# Patient Record
Sex: MALE | Race: WHITE
[De-identification: names, ages, dates, MRNs, and addresses within clinical notes are randomized per-mention and may not be internally consistent; named-entity substitution may affect disease eponyms.]

---

## 2017-06-11 NOTE — ED
Back Pain





- HPI Summary


HPI Summary: 





Patient presents to ED with CC of lower back pain which began 3 days ago with 

no known trauma or injury.  He has been taking tylenol for minimal relief of 

pain.  However, today he was unable to stand straight and upon trying to stand 

a few times, fell to the floor.  lower back "band-like" across the lower 

midspine and bilateral flanks without radiation to the legs or upper spineHe 

has been unable to sit upright, stand or twist since that time. Denies bladder 

or bowel dysfunction.  Denies health problems.  Pain is 10/10 when moving, 4/10 

when sitting in a supine with slight flexion of the hips position.  While pain 

has been worsening over 3 days, today he states is the worst and has been 

unable to stand without falling.   is at bedside.  Ambulance called 

after  was unable to carry him into the car d/t pain.  Denies recent 

illness, infection or injuries.  Patient does not work and states he has never 

had back problems.  Personal hx of HIV and Hepatitis.  Denies IV drug use.





- History of Current Complaint


Chief Complaint: EDBackInjuryPain


Stated Complaint: BACK PAIN


Time Seen by Provider: 06/11/17 11:59


Hx Obtained From: Patient


Onset/Duration: Sudden Onset


Onset/Duration: Started Days Ago


Timing: Constant


Back Pain Location: Is Discrete @ - lower back "band-like" across the lower 

midspine and bilateral flanks without radiation to the legs or upper spine


Pain Intensity: 4


Pain Scale Used: 0-10 Numeric


Character: Aching, Throbbing, Stiffness


Aggravating Symptom(s): Movement, Bending


Alleviating Symptom(s): Rest, Position


Associated Signs And Symptoms: Positive: Negative





- Risk Factors


AAA Risk Factors: Negative


TAD Risk Factors: Negative


Cauda Equina Risk Factors: Negative


Epidural Abscess Risk Factors: Negative





- Allergies/Home Medications


Allergies/Adverse Reactions: 


 Allergies











Allergy/AdvReac Type Severity Reaction Status Date / Time


 


Penicillins [PCN] Allergy  Unknown Verified 10/05/16 15:16





   Reaction  





   Details  


 


Hydromorphone [From Dilaudid] AdvReac Severe Nausea And Verified 10/05/16 15:16





   Vomiting  














PMH/Surg Hx/FS Hx/Imm Hx


Previously Healthy: Yes


Endocrine/Hematology History: 


   Denies: Hx Diabetes


Cardiovascular History: 


   Denies: Hx Congestive Heart Failure


Sensory History: Reports: Hx Contacts or Glasses


Opthamlomology History: Reports: Hx Contacts or Glasses





- Surgical History


Surgery Procedure, Year, and Place: liver bx.  double hernia surgery





- Immunization History


Hx Pertussis Vaccination: No


Immunizations Up to Date: Unable to Obtain/Confirm


Infectious Disease History: No


Infectious Disease History: Reports: Hx Hepatitis, Hx Human Immunodeficiency 

Virus (HIV)


   Denies: History Other Infectious Disease, Traveled Outside the [place] in Last 30 

Days





- Family History


Family History: NON CONTRIBUTORY





- Social History


Occupation: Unemployed


Lives: With Family


Alcohol Use: Weekly


Alcohol Amount: WEEKENDS


Hx Substance Use: Yes


Substance Use Type: Reports: Marijuana


Hx Tobacco Use: No


Smoking Status (MU): Never Smoked Tobacco





Review of Systems


Constitutional: Negative


Eyes: Negative


Cardiovascular: Negative


Respiratory: Negative


Gastrointestinal: Negative


Positive: no symptoms reported, see HPI


Positive: Arthralgia - low back pain


Neurological: Negative


Psychological: Normal


All Other Systems Reviewed And Are Negative: Yes





Physical Exam


Triage Information Reviewed: Yes


Vital Signs On Initial Exam: 


 Initial Vitals











Temp Pulse Resp BP Pulse Ox


 


 99.1 F   61   20   145/79   94 


 


 06/11/17 11:36  06/11/17 11:36  06/11/17 11:36  06/11/17 11:36  06/11/17 11:36











Vital Signs Reviewed: Yes


Appearance: Positive: Well-Appearing, Well-Nourished


Skin: Positive: Warm, Skin Color Reflects Adequate Perfusion


Head/Face: Positive: Normal Head/Face Inspection


Eyes: Positive: EOMI, HAZEL, Conjunctiva Clear


Neck: Positive: Supple, Nontender, No Lymphadenopathy


Respiratory/Lung Sounds: Positive: Clear to Auscultation, Breath Sounds Present


Cardiovascular: Positive: Normal, RRR, Pulses are Symmetrical in both Upper and 

Lower Extremities


Musculoskeletal: Positive: Other - Thorough physical exam was performed, 

focusing on thoracic and lumbar special tests and ROM.  Due to patient pain 

around injury, physical exam was limited.  Limited ROM.  Flip Test negative. 

Straight leg raise positive. Kernig test positive. Negative Babinksi. Hip 

flexion and extension, knee extension, dorsiflexion, great toe extension and 

plantar flexion intact. Rotating at hips limited d/t pain. Nerve roots L4-S2 

reflexes intact.  L1-S2 nerve root sensory intact.   No saddle anesthesia.  

Unable to assess gait.


Neurological: Positive: Sensory/Motor Intact, Alert, Oriented to Person Place, 

Time, Speech Normal


Psychiatric: Positive: Normal





Diagnostics





- Vital Signs


 Vital Signs











  Temp Pulse Resp BP Pulse Ox


 


 06/11/17 11:36  99.1 F  61  20  145/79  94














- Laboratory


Lab Statement: Any lab studies that have been ordered have been reviewed, and 

results considered in the medical decision making process.





Back Pain Course/Dx





- Course


Course Of Treatment: Patient sent to imaging.  IMPRESSION: Multilevel mild 

degenerative spondylosis with worsening compared with the 2009.  exam. At L1-L2 

there is suggestion of a small LEFT paracentral disc extrusion with mild.  

cephalad extension resulting mild impression on the LEFT ventral margin of the 

thecal sac.  Patient given orthopedic follow up in 5-7 days.  Encouraged 

Ibuprofen 600mg three times daily with meals for pain.  Toradol given as rx for 

4 days.  Flexeril given for rx.  Follow up with Dr. Hou. Lidocaine patch 5%

. Return precautions given.  Educated patient regarding back injuries and 

healing time and the need for further imaging if discomfort is present for > 6 

weeks.





- Diagnoses


Differential Diagnosis/HQI/PQRI: Positive: Herniated Disc, Strain, Sprain


Provider Diagnoses: 


 Extrusion of intervertebral disc








Discharge





- Discharge Plan


Condition: Stable


Disposition: HOME


Prescriptions: 


Cyclobenzaprine TAB* [Flexeril TAB*] 10 mg PO BID PRN #15 tab


 PRN Reason: Pain


Cyclobenzaprine TAB* [Flexeril TAB*] 10 mg PO BID #15 tab


Ketorolac TAB * [Toradol TAB *] 10 mg PO Q6H #16 tab


Ketorolac TAB * [Toradol TAB *] 10 mg PO Q6H #16 tab


Lidocaine PATCH 5%* [Lidoderm 5% Patch*] 1 patch TRANSDERM DAILY #7 patch


Lidocaine PATCH 5%* [Lidoderm 5% Patch*] 1 patch TRANSDERM DAILY #7 patch


Patient Education Materials:  Lumbar Disc Herniation (ED)


Referrals: 


Mike ARIAS,Rod PRATT [Primary Care Provider] - 


Josh Hou MD [Medical Doctor] - 


Additional Instructions: 





Follow up with PCP as scheduled on Tuesday.





Take Flexiril and toradol as prescribed.





Follow up with Dr. Hou.





Flexeril:  This medication is a muscle relaxant and can help relieve muscle 

spasms, muscle strain, or pain sensations.  Flexeril can cause side effects 

that may impair your thinking or reactions. Be careful if you drive or do 

anything that requires you to be awake and alert. Avoid drinking alcohol, which 

can increase some of the side effects of Flexeril.


Ibuprofen 600mg three times daily with meals for discomfort.


Return to ED if symptoms worsen or fail to improve, notice worsening swelling, 

warmth or redness around the joint, develop fever, or pain is uncontrolled with 

OTC medications.


Moist heat to the area for comfort.  Warm showers or baths may improve 

symptoms.  It is important to remain mobile as tolerated to prevent stiffening 

of the joints and delay healing.  


Follow up with your PCP.  If symptoms remain for > 6 weeks, please seek special 

medical attention from an orthopedic physician.

## 2017-06-11 NOTE — RAD
Indication: Severe back pain since Wednesday with worsening today. No known injury.



Comparison: November 19, 2009 CT abdomen pelvis including sagittal reformatted series.



Technique: Noncontrast CT lumbar sacral spine. Multiplanar reformation.



Report: 2.3 cm water density cyst medial midpole RIGHT kidney. 2 mm calyceal stone versus

vascular calcification midpole RIGHT kidney. Negative for hydronephrosis.



Negative for fracture or spondylolysis at any level.  Normal vertebral alignment without

spondylolisthesis at any level.



Multilevel mild vertebral endplate osteophytosis.



T12-L1: Unremarkable disc level for age without acquired spinal stenosis.



L1-L2: Suggestion of a small LEFT paracentral disc herniation with mild cephalad extension

with resulting impression on the ventral margin of the thecal sac which appears new

compared with the 2009 exam.



L2-L3: Annular disc bulge with mild resulting impression on the ventral margin of the

thecal sac.



L3-L4: Unremarkable disc level for age without acquired spinal stenosis.



L4-L5: Minimal annular disc bulge without significant impression on the thecal sac.



L5-S1: Minimal annular disc bulge without resulting impression on the thecal sac.



No CT evidence for significant foraminal stenosis at any level.



IMPRESSION: Multilevel mild degenerative spondylosis with worsening compared with the 2009

exam. At L1-L2 there is suggestion of a small LEFT paracentral disc extrusion with mild

cephalad extension resulting mild impression on the LEFT ventral margin of the thecal sac.

## 2018-06-29 NOTE — ED
Shortness of Breath





- HPI Summary


HPI Summary: 


Patient is a 43-year-old male with history of HIV/AIDS and distant cirrhosis 

from hepatitis C presents from Dr. Monk's office with complaint of 

worsening shortness of breath.  He states he has been otherwise well, denies 

any chest pain, confusion, excess fatigue.  Return from a trip from [place] 

proximally 1 month ago but denies any other more recent travel.  He is 

currently being treated with prednisone for his recent shortness of breath.  X-

ray obtained on 6/20/18 and was negative for any findings.  He is sent in today 

by Dr. Monk for a CTA for a possible PE versus other pathology.  He states 

he feels otherwise at his baseline other than his SOB.  Not on oxygen at home.








- History of Current Complaint


Chief Complaint: EDShortnessOfBreath


Time Seen by Provider: 06/29/18 13:25


Hx Obtained From: Patient


Onset/Duration: Gradual Onset


Timing: Constant


Current Severity: Moderate


Dyspnea At: Rest


Aggrevating Factors: Nothing


Alleviating Factors: Nothing


Associated Signs & Symptoms: Negative





- Risk Factors


Pulmonary Embolism: Recent Travel


Tuberculosis: Immune Deficiency





- Allergy/Home Medications


Allergies/Adverse Reactions: 


 Allergies











Allergy/AdvReac Type Severity Reaction Status Date / Time


 


MS Penicillins [PCN] Allergy  Unknown Verified 11/20/17 09:45





   Reaction  





   Details  


 


MS Hydromorphone AdvReac Severe Nausea And Verified 11/20/17 09:45





[From Dilaudid]   Vomiting  











Home Medications: 


 Home Medications





Abacavir/Dolutegravir/Lamivudi [Triumeq 600- mg] 1 tab PO DAILY 06/29/18 [

History Confirmed 06/29/18]


Sulfamethox/Trimethoprim DS* [Bactrim /160 TAB*] 1 tab PO BID 06/29/18 [

History Confirmed 06/29/18]


predniSONE TAB* [Deltasone 10 MG TAB*] 20 mg PO DAILY 06/29/18 [History 

Confirmed 06/29/18]











PMH/Surg Hx/FS Hx/Imm Hx


Previously Healthy: No - HIV/AIDS


Endocrine/Hematology History: 


   Denies: Hx Diabetes


Cardiovascular History: 


   Denies: Hx Congestive Heart Failure, Hx Hypertension, Hx Pacemaker/ICD


Respiratory History: 


   Denies: Hx Asthma


 History: 


   Denies: Hx Renal Disease


Sensory History: Reports: Hx Contacts or Glasses


   Denies: Hx Hearing Aid


Opthamlomology History: Reports: Hx Contacts or Glasses


Psychiatric History: 


   Denies: Hx Panic Disorder





- Surgical History


Surgery Procedure, Year, and Place: liver bx.  double hernia surgery





- Immunization History


Hx Pertussis Vaccination: Yes


Immunizations Up to Date: Yes


Infectious Disease History: No


Infectious Disease History: Reports: Hx Hepatitis, Hx Human Immunodeficiency 

Virus (HIV)


   Denies: History Other Infectious Disease, Traveled Outside the [place] in Last 30 

Days





- Family History


Family History: NON CONTRIBUTORY





- Social History


Occupation: Unemployed


Lives: With Family


Alcohol Use: Weekly


Alcohol Amount: WEEKENDS


Hx Substance Use: Yes


Substance Use Type: Reports: Marijuana


Substance Use Comment - Amount & Last Used: Socially


Hx Tobacco Use: No


Smoking Status (MU): Never Smoked Tobacco





Review of Systems


Positive: Fatigue.  Negative: Fever, Chills, Skin Diaphoresis


Negative: Diplopia, Other - scleraicteric


Negative: Sore Throat, Ear Ache, Nasal Discharge


Negative: Palpitations, Chest Pain


Positive: Shortness Of Breath.  Negative: Cough


Negative: Abdominal Pain, Vomiting, Diarrhea, Nausea


Negative: Arthralgia, Myalgia


Negative: Rash, Bruising


Positive: Weakness - mild


Psychological: Normal


All Other Systems Reviewed And Are Negative: Yes





Physical Exam


Triage Information Reviewed: Yes


Vital Signs On Initial Exam: 


 Initial Vitals











Temp Pulse Resp BP Pulse Ox


 


 98.8 F   79   18   164/104   99 


 


 06/29/18 13:26  06/29/18 13:26  06/29/18 13:26  06/29/18 13:26  06/29/18 13:26











Vital Signs Reviewed: Yes


Appearance: Positive: Well-Appearing


Skin: Positive: Jaundiced - mild


Head/Face: Positive: Normal Head/Face Inspection


Eyes: Positive: Other: - scleralicteric


Neck: Positive: Supple


Respiratory/Lung Sounds: Positive: Other - expiratory wheexes in R lung base


Cardiovascular: Positive: RRR, Pulses are Symmetrical in both Upper and Lower 

Extremities


Musculoskeletal: Positive: Normal, Strength/ROM Intact


Neurological: Positive: Sensory/Motor Intact, Alert, Oriented to Person Place, 

Time, Speech Normal


Psychiatric: Positive: Normal, Affect/Mood Appropriate


AVPU Assessment: Alert





Diagnostics





- Vital Signs


 Vital Signs











  Temp Pulse Resp BP Pulse Ox


 


 06/29/18 13:26  98.8 F  79  18  164/104  99














- Laboratory


Lab Results: 


 Lab Results











  06/29/18 06/29/18 06/29/18 Range/Units





  13:58 13:58 13:58 


 


WBC  3.4 L    (3.5-10.8)  10^3/ul


 


RBC  2.97 L    (4.00-5.40)  10^6/ul


 


RBC (Retic)  2.95 L    (4.6-6.2)  10^6/ul


 


Hgb  9.6 L    (14.0-18.0)  g/dl


 


Hct  30 L    (42-52)  %


 


HCT (Retic)  30 L    (42-52)  %


 


MCV  101 H    (80-94)  fL


 


MCH  32 H    (27-31)  pg


 


MCHC  32    (31-36)  g/dl


 


RDW  16 H    (10.5-15)  %


 


Plt Count  100 L    (150-450)  10^3/ul


 


MPV  8.2    (7.4-10.4)  um3


 


Neut % (Auto)  57.0    (38-83)  %


 


Lymph % (Auto)  35.6    (25-47)  %


 


Mono % (Auto)  6.4    (0-7)  %


 


Eos % (Auto)  0.3    (0-6)  %


 


Baso % (Auto)  0.7    (0-2)  %


 


Absolute Neuts (auto)  1.9    (1.5-7.7)  10^3/ul


 


Absolute Lymphs (auto)  1.2    (1.0-4.8)  10^3/ul


 


Absolute Monos (auto)  0.2    (0-0.8)  10^3/ul


 


Absolute Eos (auto)  0    (0-0.6)  10^3/ul


 


Absolute Basos (auto)  0    (0-0.2)  10^3/ul


 


Absolute Nucleated RBC  0    10^3/ul


 


Nucleated RBC %  0.4    


 


Smear Path Review  Pending    


 


Retic Count, Calc  10.7 H    (0.5-1.5)  %


 


Corrected Retic Count  7.1 H    (0.5-1.5)  %


 


Retic Shift Factor  1.5    


 


Retic Production Index  4.70    


 


Immature Retic Fraction  0.64    


 


Mean Retic Volume  121.3    


 


Sodium   139   (135-145)  mmol/L


 


Potassium   3.8   (3.5-5.0)  mmol/L


 


Chloride   106   (101-111)  mmol/L


 


Carbon Dioxide   26   (22-32)  mmol/L


 


Anion Gap   7   (2-11)  mmol/L


 


BUN   17   (6-24)  mg/dL


 


Creatinine   1.12   (0.67-1.17)  mg/dL


 


Est GFR ( Amer)   87.4   (>60)  


 


Est GFR (Non-Af Amer)   72.3   (>60)  


 


BUN/Creatinine Ratio   15.2   (8-20)  


 


Glucose   103 H   ()  mg/dL


 


Lactic Acid    1.4  (0.5-2.0)  mmol/L


 


Calcium   9.4   (8.6-10.3)  mg/dL


 


Total Bilirubin   3.00 H   (0.2-1.0)  mg/dL


 


AST   42 H   (13-39)  U/L


 


ALT   27   (7-52)  U/L


 


Alkaline Phosphatase   41   ()  U/L


 


Lactate Dehydrogenase   412 H   (140-271)  U/L


 


Total Protein   6.9   (6.4-8.9)  g/dL


 


Albumin   4.6   (3.2-5.2)  g/dL


 


Globulin   2.3   (2-4)  g/dL


 


Albumin/Globulin Ratio   2.0   (1-3)  


 


Blood Parasite Screen  Pending    











Result Diagrams: 


 06/29/18 13:58





 06/29/18 13:58


Lab Statement: Any lab studies that have been ordered have been reviewed, and 

results considered in the medical decision making process.





Course/Dx





- Course


Course Of Treatment: On arrival to the ED, patient was immediately placed on 

oxygen as he was satting at approximately 86%.  He was satting 84% in Dr. Monk's office PTA.  2 L oxygen [place] O2 sat to 89% he was then placed on 4 L 

oxygen which raised to 92%.  Labs obtained which show a hemolytic anemia and 

CTA shows marketed splenomegaly which could be long-standing or more acute.  

Dr. Monk to see patient who recommends admit to hospitalist service.  Dr. Hollis agrees to admit patient.  Currently ABGs are ordered.  Vital signs 

remain fairly stable with patient on 4L O2.





- Diagnoses


Provider Diagnoses: 


 Shortness of breath








- Physician Notifications


Discussed Care of Patient With: Rod Monk MD - agrees to come see 

patient in ED


Instructed by Provider To: Admit As Inpatient





Discharge





- Sign-Out/Discharge


Documenting (check all that apply): Discharge/Admit/Transfer





- Discharge Plan


Condition: Fair


Disposition: ADMITTED TO [facility]


Referrals: 


Rod Monk MD [Primary Care Provider] - 





- Billing Disposition and Condition


Condition: FAIR


Disposition: Admitted to [facility]

## 2018-06-29 NOTE — RAD
INDICATION: Chest pain. Short of breath for 2 weeks. Evaluate for pulmonary embolus or

pneumonia.



COMPARISON: Chest x-ray June 20, 2018; CTA chest May 03, 2016; CT abdomen pelvis December 19, 2009

 

TECHNIQUE: Axial source images were obtained from the thoracic inlet to the hemidiaphragms

following administration of 83 cc Omnipaque 350. CT angiographic technique was utilized.

Coronal and sagittal reconstructed images were acquired.



CHEST FINDINGS:



Neck/thyroid: The visualized neck to include the thyroid appear normal.



Chest wall: There are no acute abnormalities of the bony thorax or chest wall. There is no

supraclavicular, infraclavicular, or axillary lymphadenopathy.



Lungs : There are no pulmonary parenchymal masses or infiltrates. The pulmonary

interstitium appears normal. There are no endobronchial lesions.



Cardiomediastinal structures: There is no CT evidence of acute pulmonary embolic disease.



The heart is normal in size. There is no pericardial effusion.  There is no evidence of

aortic aneurysm or dissection. There is no mediastinal or hilar adenopathy. The esophagus

appears normal.



Pleura : There are no pleural-based masses or effusions.



Other: There is marked splenomegaly. This has probably not changed.



IMPRESSION:  NO CT EVIDENCE OF ACUTE PULMONARY EMBOLIC DISEASE. LUNGS CLEAR. MARKED

SPLENOMEGALY

## 2018-06-30 NOTE — PN
Subjective


Date of Service: 06/30/18


Interval History: 





Pt is still hypoxemic and c/o occasional dry cough and thigh chest





Objective


Active Medications: 








Acetaminophen (Tylenol Tab*)  650 mg PO Q4H PRN


   PRN Reason: FEVER/PAIN


   Last Admin: 06/30/18 07:50 Dose:  650 mg


Prednisone (Deltasone Tab*)  100 mg PO DAILY WALESKA








 Vital Signs - 8 hr











  06/30/18 06/30/18 06/30/18





  11:19 12:14 15:34


 


Temperature 98.1 F  96.9 F


 


Pulse Rate 64 61 67


 


Respiratory 20  12





Rate   


 


Blood Pressure  148/77 138/79





(mmHg)   


 


O2 Sat by Pulse 91 92 91





Oximetry   











Oxygen Devices in Use Now: Nasal Cannula


Appearance: 42 yo m in nAD, aAOx3


Eyes: No Scleral Icterus, PERRLA


Ears/Nose/Mouth/Throat: NL Teeth, Lips, Gums, Mucous Membranes Moist


Neck: NL Appearance and Movements; NL JVP, Trachea Midline


Respiratory: Symmetrical Chest Expansion and Respiratory Effort, - - coarse 

breath sounds b/l


Cardiovascular: NL Sounds; No Murmurs; No JVD, RRR


Abdominal: NL Sounds; No Tenderness; No Distention


Lymphatic: No Cervical Adenopathy


Extremities: No Edema, No Clubbing, Cyanosis


Skin: No Rash or Ulcers, No Nodules or Sclerosis


Neurological: Alert and Oriented x 3, NL Muscle Strength and Tone


Result Diagrams: 


 06/30/18 06:32





 06/30/18 06:33


Additional Lab and Data: 


 Lab Results











  06/29/18 06/29/18 06/29/18 Range/Units





  13:58 13:58 13:58 


 


WBC  3.4 L    (3.5-10.8)  10^3/ul


 


RBC  2.97 L    (4.00-5.40)  10^6/ul


 


RBC (Retic)  2.95 L    (4.6-6.2)  10^6/ul


 


Hgb  9.6 L    (14.0-18.0)  g/dl


 


Hct  30 L    (42-52)  %


 


HCT (Retic)  30 L    (42-52)  %


 


MCV  101 H    (80-94)  fL


 


MCH  32 H    (27-31)  pg


 


MCHC  32    (31-36)  g/dl


 


RDW  16 H    (10.5-15)  %


 


Plt Count  100 L    (150-450)  10^3/ul


 


MPV  8.2    (7.4-10.4)  um3


 


Neut % (Auto)  57.0    (38-83)  %


 


Lymph % (Auto)  35.6    (25-47)  %


 


Mono % (Auto)  6.4    (0-7)  %


 


Eos % (Auto)  0.3    (0-6)  %


 


Baso % (Auto)  0.7    (0-2)  %


 


Absolute Neuts (auto)  1.9    (1.5-7.7)  10^3/ul


 


Absolute Lymphs (auto)  1.2    (1.0-4.8)  10^3/ul


 


Absolute Monos (auto)  0.2    (0-0.8)  10^3/ul


 


Absolute Eos (auto)  0    (0-0.6)  10^3/ul


 


Absolute Basos (auto)  0    (0-0.2)  10^3/ul


 


Absolute Nucleated RBC  0    10^3/ul


 


Nucleated RBC %  0.4    


 


Smear Path Review  Pending    


 


Retic Count, Calc  10.7 H    (0.5-1.5)  %


 


Corrected Retic Count  7.1 H    (0.5-1.5)  %


 


Retic Shift Factor  1.5    


 


Retic Production Index  4.70    


 


Immature Retic Fraction  0.64    


 


Mean Retic Volume  121.3    


 


Sodium   139   (135-145)  mmol/L


 


Potassium   3.8   (3.5-5.0)  mmol/L


 


Chloride   106   (101-111)  mmol/L


 


Carbon Dioxide   26   (22-32)  mmol/L


 


Anion Gap   7   (2-11)  mmol/L


 


BUN   17   (6-24)  mg/dL


 


Creatinine   1.12   (0.67-1.17)  mg/dL


 


Est GFR ( Amer)   87.4   (>60)  


 


Est GFR (Non-Af Amer)   72.3   (>60)  


 


BUN/Creatinine Ratio   15.2   (8-20)  


 


Glucose   103 H   ()  mg/dL


 


Lactic Acid    1.4  (0.5-2.0)  mmol/L


 


Calcium   9.4   (8.6-10.3)  mg/dL


 


Total Bilirubin   3.00 H   (0.2-1.0)  mg/dL


 


AST   42 H   (13-39)  U/L


 


ALT   27   (7-52)  U/L


 


Alkaline Phosphatase   41   ()  U/L


 


Lactate Dehydrogenase   412 H   (140-271)  U/L


 


Total Protein   6.9   (6.4-8.9)  g/dL


 


Albumin   4.6   (3.2-5.2)  g/dL


 


Globulin   2.3   (2-4)  g/dL


 


Albumin/Globulin Ratio   2.0   (1-3)  


 


Blood Parasite Screen  Pending    














Assess/Plan/Problems-Billing


Assessment: 42 yo M with h/o HIV and Hep C presented with anemia and hypoxemia











- Patient Problems


(1) Anemia


Comment: although emily neg, LDH and bili as well as retic count high. D/w DR. Rankin: consistent with hemolytic anemia


Strated prednisone 100 mg daily   





(2) Hypoxia


Comment: CTA unremarkable.  will start Albuterol and incentive spirometry


Had similar presentation in 2016


D/w Dr. Mckeon and Chucho. Although possibility of Bactrim (that pt took 

chronically) to cause hemolytic aenmia now is low, will cont to hold


Due to possibility of PCP causing symptoms, will start Clindamycin and 

Atoivaquone.


no further serology tets recommended by ID. It is possible that atypical 

infection with virus or mycoplasma could have caused the anemia   





(3) AIDS


Comment: cont Triumeq


CD4 count on 5/15/18 157   





(4) Hepatitis C


Comment: ID will follow up as outpatient 


Pt was also noted to have splenomagaly on CT, as per d/w Dr. Rankin, it is 

similar to prior and may be related to pt's chronic liver disease   





(5) Thrombocytopenia


Comment: worsening of chronic, cont to monitor   





(6) DVT prophylaxis


Comment: Low risk, encourage ambulation

## 2018-06-30 NOTE — HP
CC:  Dr. Mckeon *

 

HISTORY AND PHYSICAL:

 

DATE OF ADMISSION:  06/29/18

 

PROVIDER:  Cassandra Dela Cruz NP

 

PRIMARY CARE PROVIDER:  Rod Mckeon MD

 

ATTENDING PHYSICIAN WHILE IN THE HOSPITAL:  Jose Hollis MD * (dictated by 
Cassandra Dela Cruz NP)

 

CHIEF COMPLAINT:  Shortness of breath.

 

HISTORY OF PRESENT ILLNESS:  Mr. Mccann is 43-year-old gentleman with a past 
medical history significant for HIV and hepatitis C, who reports that his HIV 
was originally diagnosed with AIDS before starting on therapy 20 years ago.  
Mr. Mccann reports to the emergency room complaining of increased shortness of 
breath and cough that is nonproductive.  The patient reports that he was in 
[place] and returned on June 3rd. Since that time he has felt he has had 
increased shortness of breath and a cough that has progressively gotten worse 
over the past few weeks. Due to his symptoms, he was seen by Dr. Mckeon on 06/
20/18 for a cough that was nonproductive and at times, the cough got so 
excessive that it was hard for him to catch his breath that was prior to 
starting antibiotics on 06/20/18.  He was seen at Dr. Mckeon's office on 06/20
/18, he was started on Bactrim DS 1 tablet p.o. b.i.d. and as well as 
prednisone 20 mg p.o. daily.  He states that he was taking his medications, 
which he started on the 20th and then he took them the 21st, 22nd, 23rd, 24th. 
He reports that on Monday, he had difficulty with medications as he vomited 
after taking the Bactrim and was consistently nauseous.  He has been unable to 
keep his antibiotics down since Monday.  He called and had a reexamination by 
Dr. Mckeon today and was sent to  the emergency room for further evaluation 
of his increased shortness of breath and hypoxia. He was also found to have a 
low O2 saturations in the office today, so he was sent to the emergency room 
for further evaluation. 



While in the emergency room, he was placed on the monitor, had routine lab work
, he was found to have an O2 saturation of 86%.  He was placed on O2 via nasal 
cannula at 4 L, which he reports help alleviate his shortness of breath 
symptoms.    He denies any fever or chills.  Denies any loss of appetite. 
Denies chest pain or edema.  He does report a cough.  Denies any hemoptysis.  
He does report increased shortness of breath x3 weeks.  He does also report 
some nausea and dry heaves and does report he has had diarrhea 06/21, after 
starting the antibiotic, denies any abdominal pain. Denies any gross hematuria 
or dysuria. He denies any focal weakness or sensory loss.  Denies any visual 
complaints.  Denies any difficulty swallowing.  Denies any arthralgias or 
myalgias.  Denies any rashes or lesions.  Denies any depression. He does report 
some mild anxiety due to his hypoxia and room air O2 saturations at 86%.  We 
are asked to see him and evaluate him for admission.

 

PAST MEDICAL HISTORY:  Significant for:

1.  HIV.

2.  Hepatitis C.

 

PAST SURGICAL HISTORY:  None.

 

FAMILY HISTORY:  No reported history of coronary arteries, diabetes, or cancer.

 

SOCIAL HISTORY:  The patient denies tobacco use. He does report occasional 
alcohol use approximately once a week.  He does also report occasional 
marijuana use.  He is currently disabled.  Surrogate decision maker in the 
event he is unable to make his own decision is his partner, Doroteo Obrien; 
his phone number is [phone]. The patient is a full code.

 

REVIEW OF SYSTEMS:  There was no documented fever.  There has been no 
significant weight change.  There is no double vision.  No ear discharge.  
Denies any rhinorrhea.  He denies any sore throat.  He denies any chest pain.  
Denies any orthopnea or nocturnal dyspnea.  There was no abdominal pain.  He 
does report some nausea and dry heaving and diarrhea.  He denies any dysuria or 
urinary frequency. Denies any seizures or loss of consciousness.  No pruritus 
or skin ulcerations.  He does report some shortness of breath x3 weeks with a 
nonproductive cough.  A review of 14 systems was completed and all others were 
negative.

 

                               PHYSICAL EXAMINATION

 

GENERAL:  At this time, Mr. Mccann is a 43-year-old male, who appears well, 
sitting on the stretcher in the emergency room.  He does not appear to be in 
any acute distress.

 

VITAL SIGNS:  Blood pressure 144/93, temperature was 98.8, pulse was 74, 
respirations 17, O2 saturation with 2 L nasal cannula is 90 to 92.

 

HEENT:  Head is atraumatic, normocephalic.  Eyes:  EOMs are intact.  Sclerae 
anicteric and not pale.  Oral mucosa appeared to be moist.

 

NECK:  Supple.

 

LUNGS:  Clear to auscultation bilaterally.  No wheezes, rales, or rhonchi.

 

CARDIAC:  S1, S2.  Regular rate and rhythm.  There are no murmur, rubs, or 
gallops.

 

ABDOMEN:  Soft and nontender.  Bowel sounds are present x4.

 

EXTREMITIES:  Pulses are +2 throughout.  He is able to move all 4 extremities 
with 5/5 strength.

 

NEUROLOGIC:  He is awake, he is alert and oriented x4.  Speech is clear.  There 
are no focal deficits.

 

SKIN:  Intact.

 

 DIAGNOSTIC STUDIES/LAB DATA:  WBCs are 3.4, RBCs 2.97, RBC retic is 2.95, 
hemoglobin was 9.6, hematocrit was 30, hematocrit retic is 30, MCV is 101, MCH 
was 32, RDW is 16, platelet count was 100, retic count calculated was 10.7, 
corrected retic count is 7.1.  D-dimer was less than 200.  ABG, pH was 7.44, 
pCO2 was 35, pO2 was 74, HCO3 was 24.6, O2 saturation was 87.5. Chemistry, 
sodium 139, potassium 3.8, chloride was 106, carbon dioxide was 26, anion gap 
was 7, BUN was 17, creatinine 1.12, glucose was 103, lactic acid was 1.4, 
calcium 9.4, total bilirubin was 3.0, ASTs are 42, ALTs were 27, lactate 
dehydrogenase was 412, blood parasite screen, no parasites were seen.

 

He had a CTA of the chest. No evidence of acute pulmonary embolic disease.  
Lungs are clear, marked splenomegaly.

 

ASSESSMENT AND PLAN:  Mr. Mccann is a 43-year-old male, who presented to the 
emergency room with complaints of increased shortness of breath.  We were asked 
to see and evaluate him due to his hypoxia with room air O2 saturations of 86%.
  He will be admitted under inpatient for:

 

1.  Acute hypoxic respiratory failure, I suspect this may be related to 
bronchitis as he has had a persistent cough for over 2 weeks.  we will place 
him on oxygen 2 to 4 L to keep his O2 saturation above 92%.  We will continue 
to monitor him.  We will repeat CBC and a CMP in the a.m.  He did have a CT of 
the chest that was negative for pulmonary embolism. At this time, I will 
continue him on prednisone 20 mg p.o. daily.  His CT of the chest did not show 
any evidence of pneumonia.  I will get a transthoracic echocardiogram to rule 
out cardiac dysfunction as a cause of his hypoxia.

2.  Anemia question hemolysis.  Dr. Rankin has been consulted.  We will repeat 
his CBC and a retic count in the morning.  I suspect this could also possibly 
be related to Bactrim that he recently started.  The anemia could also be 
causing his increased shortness of breath.

3.  Human immunodeficiency virus.  Continue his home medication, Triumeq.

4.  FEN.  He can be on a regular diet.

5.  Code status.  He is full code.

6.  DVT prophylaxis.  I will place him on SCDs.  As his platelet count is 100 
and has had a recent drop in his hemoglobin from 14.3 on 05/15/18 to 9.6 on 06/
29/18. He denies any black or tarry stools.  Denies any vomiting of blood.  At 
this time, we have consulted Dr. Rankin for further recommendations.

 

DISPOSITION:  He will be placed inpatient.

 

TIME SPENT:  Time spent on this admission was approximately 60 minutes, greater 
than half of that time was spent face-to-face with the patient obtaining my 
history and physical, the other half of the time was spent going over my plan 
of care and implementing my plan of care.

 

I have discussed this with my attending, Dr. Jose Hollis, and he is in 
agreement with my plan.

 

 ____________________________________ CASSANDRA DELA CRUZ, FABY

 

[id number]/CPS #: [id number]

[facility]

## 2018-06-30 NOTE — CONS
CC:  Dr. Rod Mckeon. *

 

MEDICAL ONCOLOGY/HEMATOLOGY CONSULTATION NOTE:

 

DATE OF CONSULT:  06/29/18.

 

REASON FOR CONSULT:  Hemolytic anemia.

 

HISTORY OF PRESENT ILLNESS:  Mr. Mccann is a 43-year-old male, who was 
originally diagnosed with HIV 18 years ago.  HIV was diagnosed and actually 
AIDS was diagnosed at that time due to extremely low blood counts and positive 
HIV titer.  The patient has been well controlled ever since and it is reported 
that his levels have remained undetectable since initial multidrug therapy.  He 
also was found to have a hepatitis C several years ago and was treated with a 
several-month course of therapy for this and with this, most recent hepatitic C 
titer has been negative. He has remained with low CD4 count ever since his 
initial diagnosis.

 

The patient had developed cough and shortness of breath after returning from 
[place] in early June.  He was seen for this by Dr. Mckeon on 06/20/18.  He 
was placed on Bactrim 2 pills b.i.d. along with prednisone.  He is unsure of 
the dose but it sounds like 20 mg daily.  He reports that for several days he 
was able to take this, but over the last 5 days has had significant nausea and 
dry heaves every time he tries to take either the Bactrim or the prednisone.  
He is able to keep food down as long as he does not take his medications with 
it.  He reports that previous to this he was already on low-dose suppressive 
Bactrim.

 

He presents today to Dr. Mckeon's office and was found to have a worsening 
shortness of breath and hypoxia and was sent to the emergency room.  In the 
emergency room, he was found to have an oxygen saturation of 86%, which 
improved well over 90 on 4 L by nasal cannula.  CT scan of the chest was 
performed and a CTA to rule out both pneumonia and also, pulmonary embolism.  
This revealed no evidence of any underlying embolic disease, lungs were clear.  
On this imaging, it was clear that he had significant splenomegaly.  The caudad 
portion of the spleen was not seen, but the spleen craniocaudal dimension was 
at least 18 cm, although it had never been called in the past on review of old 
CT scans from both 2016 and 2009, he had a spleen of at least of 18 cm.  The 
patient was unaware that he has ever had an enlarged spleen  It should be noted 
that he also had intermittently low platelet counts in the past typically 
running in 110 to 130 range in 2013 to 2017, which is as far back as our 
records go here at [facility].  The patient himself is unaware of 
previously low platelet counts but his partner reports that it is known to him.

 

He has never been anemic from 2013 to May of 2018 with a hematocrit of 43 and a 
hemoglobin of 14.3 on May, 2018.  When he presented to the emergency room, his 
H and H are 29/9.6.

 

The patient reports that his appetite has been good.  He denies any significant 
weight loss, denies any significant early satiety.  Denies any sweat, chills or 
fever. Denies any history of bleeding or bruising recently.  Denies any urinary 
symptoms.

 

PAST MEDICAL HISTORY:  Significant for HIV with initial diagnosis of AIDS, 
hepatitis C, thrombocytopenia. Hospitalized in 2016 with episode of hypoxia.  
At that time, he was found to have pneumocystic carinii pneumonia and was 
treated with the Bactrim and prednisone. There were no acute findings noted on 
the chest x-ray or the CT at that time.  No hypertension, diabetes, MI or CVA.

 

SOCIAL HISTORY:  The patient denies any cigarette use.  Rare alcohol, may be 4 
drinks once a week most weeks, but rarely otherwise.  Occasional marijuana use.
  He has never worked outside the home.  He is disabled.  His surrogate 
decision maker is his  and partner, Doroteo Obrien.

 

FAMILY HISTORY:  Noncontributory.

 

REVIEW OF SYSTEMS:  As discussed above.

 

LABORATORY STUDIES/IMAGING:  CBC:  White count 3400, hematocrit 30, hemoglobin 
9.6, platelet count of 100,000, MCV of 101, 57% neutrophils, 36% leukocytes, 6% 
monocytes on machine diff, reticulocyte count is 10.7,  corrected to 7.1. 
Chemistry studies include bilirubin of 3, LDH of 412, normal ALT of 27, AST 
minimally elevated at 42, albumin of 4.6, normal electrolytes and renal 
function. Urine with trace red blood cells and trace white blood cells.  Ilana 
test is negative.

 

MEDICATIONS:  Include;

1.  Recently prescribed higher dose Bactrim, otherwise he has been on Bactrim 
long- term.

2.  Recently prescribed prednisone.

3.  Triumeq 600/50/300 one tablet daily.

 

No other medications.

 

PHYSICAL EXAM:  A 43-year-old male in no acute distress.  Vital Signs:  Blood 
pressure 144/93, pulse 74, O2 saturation on room air at 86%, on 2 L just over 90
%, afebrile.  HEENT:  PERRL, EOMI.  No erythema or exudate.  No palpable 
cervical, supraclavicular or axillary adenopathy.  Lungs:  Clear.  Heart:  
Regular rate and rhythm without murmurs, rubs or gallops.  Abdomen:  Soft, 
nontender without masses or organomegaly.  Extremities are without clubbing, 
cyanosis or edema.  Back:  No CVA or spinal tenderness.  Neurologic Exam:  
Without focal deficits.

 

IMPRESSION:  A 43-year-old male with a previously diagnosed acquired 
immunodeficiency syndrome and longstanding human immunodeficiency virus for 18 
years status post treatments in the past for hep C and most recent hep C 
quantitative antibodies were undetectable in February, 2018.

 

This is not classic autoimmune hemolytic anemia in that his Ilana test is 
negative.  This is not completely ruled this out.  Peripheral smear is 
reviewed. There are low numbers of platelets but potentially slightly more than 
100,000 count on the recent CBC.  There is no evidence for any active bleeding.
  The patient is not receiving transfusions recently.  Other than prednisone, 
he has not been on no new medications recently.  He has not had lifelong 
anemias.  There is no reason to consider a mechanical stress causing it ______ 
such as cardiac issues.  There is no obvious TTP or DIC based on only 
occasional schistocytes seen on the peripheral smear.  He actually had some 
elevated bilirubin presumably mostly indirect along with elevated reticulocyte 
count and LDH confirms hemolytic anemia as the cause  of his anemia.  He has 
had longstanding splenomegaly so it is unlikely that there is a malignant cause 
for his splenomegaly.  There is no family history of anemia or hemolysis.  
There are no tear drops to suggest pulmonary involvement.  There is no evidence 
of malaria or ______ on the blood smear.  The best approach would be in spite 
of the negative direct antibody test/Ilana to assume that this is one of those 
rare cases of autoimmune hemolytic anemia and treat him with steroids, although 
see if his blood count stabilize and/or improve over the next couple of days.  
If his blood counts improve, I do not believe that there will be any need for 
any further workup.  He should also be on folic acid to try to help boost his 
red cell production.  The patient will be followed while in the hospital.

 

His hypoxia is really not explainable by the degree of his anemia in spite of 
his rapid drop six weeks ago, for someone without underlying heart or pulmonary 
issues, one should not be this symptomatic from hemoglobin of 9.6.  Clearly, 
Infectious Disease only to weigh in as to whether to treat him aggressively for 
potential infection such as pneumo-cystiasis as it was felt to be the case 2 
years ago.  He does remain with a low CD4 count, although not as low as at that 
time.  He has not tolerated p.o. Bactrim and either IV Bactrim or other 
medications would then be needed.  Clearly the use of steroids will also be 
appropriate and useful if that were to prove to be the case.

 

 [id number]/CPS #: [id number]

[facility]

## 2018-07-01 NOTE — PN
Subjective


Date of Service: 07/01/18


Interval History: 





Feels much better, despite still 02 requirement at 4 L, feels like he can 

recuperate at home





Objective


Active Medications: 








Acetaminophen (Tylenol Tab*)  650 mg PO Q4H PRN


   PRN Reason: FEVER/PAIN


   Last Admin: 06/30/18 07:50 Dose:  650 mg


Albuterol (Ventolin Hfa Inhaler*)  2 puff INH Q4H PRN


   PRN Reason: SOB/WHEEZING


Atovaquone (Mepron*)  750 mg PO BID [facility]


   Last Admin: 07/01/18 08:27 Dose:  750 mg


Folic Acid (Folvite Tab*)  1 mg PO DAILY [facility]


   Last Admin: 07/01/18 08:28 Dose:  1 mg


Clindamycin HCl/Dextrose (Cleocin 600 Mg Ivpremix(*) Sdv)  600 mg in 50 mls @ 

100 mls/hr IV Q8H [facility]


   Last Admin: 07/01/18 13:27 Dose:  100 mls/hr


Lactobacillus Rhamnosus (Lactobacillus Acidophilus*)  1 tab PO BID [facility]


   Last Admin: 07/01/18 08:28 Dose:  1 tab


Prednisone (Deltasone Tab*)  100 mg PO DAILY [facility]


   Last Admin: 07/01/18 08:28 Dose:  100 mg








 Vital Signs - 8 hr











  07/01/18





  10:03


 


Temperature 98.0 F


 


Pulse Rate 63


 


Respiratory 18





Rate 


 


Blood Pressure 148/78





(mmHg) 


 


O2 Sat by Pulse 94





Oximetry 











Oxygen Devices in Use Now: Nasal Cannula


Appearance: 44 yo M in nAD, AAOx3


Eyes: No Scleral Icterus, PERRLA


Ears/Nose/Mouth/Throat: NL Teeth, Lips, Gums, Mucous Membranes Moist


Neck: NL Appearance and Movements; NL JVP, Trachea Midline


Respiratory: Symmetrical Chest Expansion and Respiratory Effort, Clear to 

Auscultation


Cardiovascular: NL Sounds; No Murmurs; No JVD, RRR


Abdominal: NL Sounds; No Tenderness; No Distention


Lymphatic: No Cervical Adenopathy


Extremities: No Edema, No Clubbing, Cyanosis


Skin: No Rash or Ulcers, No Nodules or Sclerosis


Neurological: Alert and Oriented x 3, NL Muscle Strength and Tone


Result Diagrams: 


 07/01/18 10:32





 07/01/18 10:32


Additional Lab and Data: 


 Lab Results











  06/29/18 06/29/18 06/29/18 Range/Units





  13:58 13:58 13:58 


 


WBC  3.4 L    (3.5-10.8)  10^3/ul


 


RBC  2.97 L    (4.00-5.40)  10^6/ul


 


RBC (Retic)  2.95 L    (4.6-6.2)  10^6/ul


 


Hgb  9.6 L    (14.0-18.0)  g/dl


 


Hct  30 L    (42-52)  %


 


HCT (Retic)  30 L    (42-52)  %


 


MCV  101 H    (80-94)  fL


 


MCH  32 H    (27-31)  pg


 


MCHC  32    (31-36)  g/dl


 


RDW  16 H    (10.5-15)  %


 


Plt Count  100 L    (150-450)  10^3/ul


 


MPV  8.2    (7.4-10.4)  um3


 


Neut % (Auto)  57.0    (38-83)  %


 


Lymph % (Auto)  35.6    (25-47)  %


 


Mono % (Auto)  6.4    (0-7)  %


 


Eos % (Auto)  0.3    (0-6)  %


 


Baso % (Auto)  0.7    (0-2)  %


 


Absolute Neuts (auto)  1.9    (1.5-7.7)  10^3/ul


 


Absolute Lymphs (auto)  1.2    (1.0-4.8)  10^3/ul


 


Absolute Monos (auto)  0.2    (0-0.8)  10^3/ul


 


Absolute Eos (auto)  0    (0-0.6)  10^3/ul


 


Absolute Basos (auto)  0    (0-0.2)  10^3/ul


 


Absolute Nucleated RBC  0    10^3/ul


 


Nucleated RBC %  0.4    


 


Smear Path Review  Pending    


 


Retic Count, Calc  10.7 H    (0.5-1.5)  %


 


Corrected Retic Count  7.1 H    (0.5-1.5)  %


 


Retic Shift Factor  1.5    


 


Retic Production Index  4.70    


 


Immature Retic Fraction  0.64    


 


Mean Retic Volume  121.3    


 


Sodium   139   (135-145)  mmol/L


 


Potassium   3.8   (3.5-5.0)  mmol/L


 


Chloride   106   (101-111)  mmol/L


 


Carbon Dioxide   26   (22-32)  mmol/L


 


Anion Gap   7   (2-11)  mmol/L


 


BUN   17   (6-24)  mg/dL


 


Creatinine   1.12   (0.67-1.17)  mg/dL


 


Est GFR ( Amer)   87.4   (>60)  


 


Est GFR (Non-Af Amer)   72.3   (>60)  


 


BUN/Creatinine Ratio   15.2   (8-20)  


 


Glucose   103 H   ()  mg/dL


 


Lactic Acid    1.4  (0.5-2.0)  mmol/L


 


Calcium   9.4   (8.6-10.3)  mg/dL


 


Total Bilirubin   3.00 H   (0.2-1.0)  mg/dL


 


AST   42 H   (13-39)  U/L


 


ALT   27   (7-52)  U/L


 


Alkaline Phosphatase   41   ()  U/L


 


Lactate Dehydrogenase   412 H   (140-271)  U/L


 


Total Protein   6.9   (6.4-8.9)  g/dL


 


Albumin   4.6   (3.2-5.2)  g/dL


 


Globulin   2.3   (2-4)  g/dL


 


Albumin/Globulin Ratio   2.0   (1-3)  


 


Blood Parasite Screen  Pending    











Microbiology and Other Data: 


 Microbiology











 06/30/18 10:47 Urine Culture - Final





 Urine    No Growth (<1,000 CFU/mL)














Assess/Plan/Problems-Billing


Assessment: 44 yo M with h/o HIV and Hep C presented with anemia and hypoxemia











- Patient Problems


(1) Anemia


Comment: although emily neg, LDH and bili as well as retic count high. D/w DR. Rankin: consistent with hemolytic anemia


Strated prednisone 100 mg daily on 6/30/18, now Hb up to 11. Also pt feels 

better   





(2) Hypoxia


Comment: CTA unremarkable.  Cont  Albuterol and incentive spirometry


Had similar presentation in 2016


D/w Dr. Theodore. Although possibility of Bactrim (that pt took 

chronically) to cause hemolytic anemia now is low, will cont to hold


Due to possibility of PCP causing symptoms, started  Clindamycin and 

Atoivaquone on 6/10/18.


no further serology tets recommended by ID. It is possible that atypical 

infection with virus or mycoplasma could have caused the anemia   





(3) AIDS


Comment: cont Triumeq


CD4 count on 5/15/18 157   





(4) Hepatitis C


Comment: ID will follow up as outpatient 


Pt was also noted to have splenomagaly on CT, as per d/w Dr. Rankin, it is 

similar to prior and may be related to pt's chronic liver disease   





(5) Thrombocytopenia


Comment: worsening of chronic,now improved, cont to monitor   





(6) DVT prophylaxis


Comment: Low risk, encourage ambulation    


Status and Disposition: 


inpatient, possible d/c tomorrow likely on 02

## 2018-07-01 NOTE — ECHO
Patient:      ABNER STERLING

[facility] Rec#:     [id number]            :          1975          

Date:         2018            Age:          43y                 

Account#:     [id number]          Height:       180.3 cm / 71.0 in

Accession#:   [id number]           Weight:       108 kg / 238.0 lbs

Sex:          M                     BSA:          2.3

Room#:        453                   

Admit Date#:  2018          

 

Referring:    Cassandra Dela Cruz

Reading:      Lawrence Wiggins MD

Sonographer:  Lynette Casper RN RDCS

CC:           Durga Rankin MD

______________________________________________________________________

 

Transthoracic Echocardiogram

 

Indication:

Shortness of breath, hypoxia

BP:           135/70

HR:           60

Rhythm:       NSR

 

Findings     

History:

HIV, Hepatitis C, obesity, recent bronchitis 

 

Technical Comments:

The study quality is fair.  The study is technically limited due to

patient body habitus.  

 

Left Ventricle:

The left ventricular chamber size is normal. Moderate concentric left

ventricular hypertrophy is observed. Global left ventricular wall motion

and contractility are within normal limits. There is normal left

ventricular systolic function. The estimated ejection fraction is

60-65%.  Normal left ventricular diastolic filling is observed. 

 

Left Atrium:

The left atrium is slightly dilated.  

 

Right Ventricle:

The right ventricle is slightly dilated.  The right ventricular global

systolic function is normal. 

 

Right Atrium:

The right atrial cavity size is normal. The interatrial septum appears

lipomatous.  

 

Aortic Valve:

The aortic valve is trileaflet. The aortic valve leaflets are mildly

thickened. There is a trace of aortic regurgitation. There is no

evidence of aortic stenosis. 

 

Mitral Valve:

The mitral valve leaflets are mildly thickened. There is mild mitral

regurgitation.  There is no evidence of mitral stenosis. 

 

Tricuspid Valve:

The tricuspid valve leaflets are normal.  There is trace tricuspid

regurgitation.  Unable to estimate the right ventricular systolic

pressure.   There is no tricuspid stenosis. 

 

Pulmonic Valve:

The pulmonic valve appears normal. There is mild pulmonic regurgitation.

 There is no pulmonic stenosis.  

 

Pericardium:

There is no significant pericardial effusion. A pericardial fat pad is

visualized. 

 

Aorta:

There is no dilatation of the ascending aorta. There is no dilatation of

the aortic arch. There is no dilation of the aortic root. 

 

Pulmonary Artery:

The main pulmonary artery appears normal. 

 

Venous:

The inferior vena cava appears normal in size. There is a greater than

50% respiratory change in the inferior vena cava dimension. 

 

Summary:

There was not any prior study for comparison. 

 

Conclusions

Global left ventricular wall motion and contractility are within normal

limits.

There is normal left ventricular systolic function.

The estimated ejection fraction is 60-65%. 

The right ventricular global systolic function is normal.

There is a trace of aortic regurgitation.

There is mild mitral regurgitation. 

There is trace tricuspid regurgitation. 

Unable to estimate the right ventricular systolic pressure.  

There is no significant pericardial effusion.

 

Measurements     

Name                    Value         Normal Range            

RVDdMajor (2D)          4.5 cm        (2.2 - 4.4)             

RAd ISD 4CH             4.3 cm        (3.4 - 4.9)             

RA (A4C)W               3.9 cm        (2.9 - 4.6)             

IVSd (2D)               1.6 cm        (0.6 - 1)               

LVPWd (2D)              1.5 cm        (0.6 - 1)               

LVIDd (2D)              5.3 cm        (3.6 - 5.4)             

LVIDs (2D)              3.2 cm        -                        

LV FS (2D)              40 %          (25 - 45)               

Aortic Annulus          2.3 cm        (1.4 - 2.6)             

Ao root diameter (2D)   3.2 cm        (2.1 - 3.5)             

Ascending Ao            3.1 cm        (2.1 - 3.4)             

Aortic arch             2.4 cm        (1.8 - 3.4)             

LA dimension (AP) 2D    4.1 cm        (2.3 - 3.8)             

LAd ISD 4CH             5 cm          (2.9 - 5.3)             

LA ISD 4CH W            4.4 cm        (2.5 - 4.5)             

 

Name                    Value         Normal Range            

LA ESV SP 4CH (A/L)     82 ml         -                        

LA ESV SP 2CH (A/L)     69 ml         -                        

LA ESV BP (A/L)         77 ml         -                        

LA ESV BP (A/L) index   34 ml/m2      -                        

LA ESV SP 4CH (MOD)     77 ml         -                        

LA ESV SP 2CH (MOD)     65 ml         -                        

 

Name                    Value         Normal Range            

MV E-wave Vmax          0.9 m/sec     -                        

MV deceleration time    286 msec      -                        

MV A-wave Vmax          0.52 m/sec    -                        

MV E:A ratio            1.7 ratio     -                        

LV septal e' Vmax       0.08 m/sec    -                        

LV lateral e' Vmax      0.14 m/sec    -                        

LV E:e' septal ratio    11.3 ratio    -                        

LV E:e' lateral ratio   6.4 ratio     -                        

 

Name                    Value         Normal Range            

AV Vmax                 1.6 m/sec     -                        

AV VTI                  31.6 cm       -                        

AV peak gradient        9.6 mmHg      -                        

AV mean gradient        5.9 mmHg      -                        

LVOT Vmax               1.5 m/sec     -                        

LVOT VTI                28.8 cm       -                        

LVOT peak gradient      9.3 mmHg      -                        

LVOT mean gradient      4.9 mmHg      -                        

BELEN Vmax                1.4 m/sec     -                        

 

Name                    Value         Normal Range            

IVC diameter            1.8 cm        -                        

 

Name                    Value         Normal Range            

PV Vmax                 1 m/sec       -                        

 

Electronically signed by: Lawrence Wiggins MD on 2018 11:09:43

## 2018-07-02 NOTE — DS
*** ADDENDUM INCLUDED ON THIS REPORT ***



CC:  Dr. Mckeon *

 

DISCHARGE SUMMARY:

 

DATE OF ADMISSION:  06/29/18

 

DATE OF PATIENT LEAVING AGAINST MEDICAL ADVICE:  07/02/18

 

PRIMARY CARE PROVIDER:  Dr. Mckeon.

 

MEDICATIONS AT DISCHARGE:  Include:

1.  Prednisone 40 mg b.i.d. for 5 days, then 40 mg daily for 5 days, then 20 mg 
for a total of 11 days and then stop.

2.  Triumeq 600/50/300 one tablet daily.

3.  Clindamycin 300 mg 3 times a day.  The patient is to take for a total of 3 
weeks.

4.  Atovaquone 750 mg b.i.d.  The patient is to take for a total of 3 weeks.





 

FOLLOWUP:  It is recommended for the patient to follow up with Dr. Mckeon in 
approximately 1 week.

 

DISCHARGE DIAGNOSES:  Include:

1.  Hypoxemia.

2.  Possibility of pneumocystis infection.

3.  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

 

LABORATORY DATA AND STUDIES PERFORMED DURING THE HOSPITAL STAY:  Included on 07/
02/18, white blood cell count of 12.3, hemoglobin of 11.1, hematocrit of 34, 
and platelets were 125.  The patient's reticulocyte count calculated was 11 and 
corrected reticulocyte count was 7.1 that was documented on 06/30/18.  On 07/01/
18, sodium of 139, potassium 3.8, chloride 101, carbon dioxide 29, BUN 21, 
creatinine 1.17.  The patient's iron level was 121, TIBC 340, percent iron 
saturation 36, transferrin of 243, and ferritin of 249.  The patient's total 
bilirubin on 07/01/18 was documented at 4.2, direct of 0.6, and indirect of 
3.6.  Vitamin B12 was 273.

 

Transthoracic echocardiogram obtained on 06/29/18 showed EF of 60% to 65% with 
trace of aortic regurgitation, mild mitral regurgitation, trace tricuspid 
regurgitation, and no significant pericardial effusion.

 

CT angiogram of the chest obtained on 06/29/18, impression:  "No CT evidence of 
acute pulmonary embolic disease.  Lungs clear.  Marked splenomegaly."

 

CONSULTATIONS DURING THE HOSPITAL STAY:  Included Dr. Mckeon from Infectious 
Diseases.  Dr. Rankin and Dr. Gutierrez from Oncology and Hematology.

 

HOSPITALIZATION COURSE:  Gilberto Mccann is a 43-year-old male who has history of 
HIV and who presented to the hospital with hypoxemia.  The patient has history 
of upper respiratory infection symptoms with cough and not feeling well for 
approximately 3 week prior.  Due to that, his Bactrim that he used for 
prophylaxis was prescribed by Dr. Mckeon as twice a day.  The patient 
presented to Dr. Mckeon's office with noted hypoxemia and on 06/29/18 was 
directed for admission.  Here, his CT angiogram was unremarkable.  He was noted 
to have significant anemia with hemoglobin of 9.6 at admission and his baseline 
hemoglobin was 14 in the past.  At this point, a hemolysis diagnosis was 
entertained.  Hematology consultant the patient in consultation and noted that 
although the patient's Ilana tests were negative, the patient's LDH was 
documented to be increased at 412.  The laboratory values that are still 
pending at the time of dictation include cold agglutinins as well as 
cryoglobulin levels as well as quantitative G6PD level.

 

He was placed on prednisone at 100 mg daily after recommendation of the 
hematology group, which improved his hemoglobin from 9 to 11 by the time of 
discharge. Unfortunately, we did not find a reason for his hypoxemia and after 
discussion with Dr. Mckeon, the patient was placed on PCP treatment with 
clindamycin and atovaquone.  With that, he had marked improvement, although he 
is continued to be hypoxemic and he qualified for oxygen at home.  He is going 
to be discharged home on oxygen at 2 L continuously.

 

Unfortunately, the patient could not wait to be discharged by the attending 
provider on the day of planned discharge and signed against medical advice 
before he was able to be attended to for discharge recommendations.  He was not 
seen by the attending provider at the time of discharge and signed against 
medical advice.

 

He is aware of recommendations of following up with Dr. Mckeon in 
approximately 1 week and his medication prescriptions were sent to [facility] 
[facility]arm[facility]. 



The patient was recommended to follow up with Dr. Rankin as outpatient in 
approximately 1 week and as mentioned above with Dr. Mckeon in approximately 
1 week.  The doses of patient's prednisone taper at discharge was discussed 
with both of the consultants.

 

 

 [id number]/CPS #: [id number]

A- [id number]/CPS #: [id number]

NIKO

## 2018-07-02 NOTE — DS
DISCHARGE SUMMARY:

 

ADDENDUM:  The patient's prednisone dose is as follows:

 

40 mg b.i.d. for 5 days, then 40 mg daily for 5 days, then 20 mg for a total of 
11 days and then stop.

 

The patient was recommended to follow up with Dr. Rankin as outpatient in 
approximately 1 week and as mentioned above with Dr. Mckeon in approximately 
1 week.  The doses of patient's prednisone taper at discharge was discussed 
with both of the consultants.

 

 [id number]/CPS #: [id number]

NIKO

## 2018-07-02 NOTE — PN
Progress Note





- Progress Note


Date of Service: 07/02/18


SOAP: 


Subjective:


[]Feels well today, better. Walking around and not SOB. No fevers.  











Acetaminophen (Tylenol Tab*)  650 mg PO Q4H PRN


   PRN Reason: FEVER/PAIN


   Last Admin: 06/30/18 07:50 Dose:  650 mg


Albuterol (Ventolin Hfa Inhaler*)  2 puff INH Q4H PRN


   PRN Reason: SOB/WHEEZING


Atovaquone (Mepron*)  750 mg PO BID [facility]


   Last Admin: 07/02/18 08:27 Dose:  750 mg


Folic Acid (Folvite Tab*)  1 mg PO DAILY [facility]


   Last Admin: 07/02/18 08:27 Dose:  1 mg


Clindamycin HCl/Dextrose (Cleocin 600 Mg Ivpremix(*) Sdv)  600 mg in 50 mls @ 

100 mls/hr IV Q8H [facility]


   Last Admin: 07/02/18 05:13 Dose:  100 mls/hr


Lactobacillus Rhamnosus (Lactobacillus Acidophilus*)  1 tab PO BID [facility]


   Last Admin: 07/02/18 08:28 Dose:  1 tab


Prednisone (Deltasone Tab*)  100 mg PO DAILY [facility]


   Last Admin: 07/02/18 08:29 Dose:  100 mg





Objective:


[]


 Vital Signs











Temp Pulse Resp BP Pulse Ox


 


 98.3 F   59   16   145/88   96 


 


 07/02/18 07:47  07/02/18 07:47  07/02/18 08:00  07/02/18 07:47  07/02/18 09:52








HEENT: No oral lesions, no thrush


CTA


RRR S1S2


+BS, NT and ND, no spleen. Has liver edge, obese


Ext no C/C/E








Assessment:


[]43 year old with high production, anemia, increased MCV. Working diagnosis is 

AIHA, Ilana negative. He has improved on steroids but Hgb remains bellow 

baseline. Ddx also includes, hemolysis associated with PCP, B12 deficiency, 

other immune mediated anemia, infection. 





Plan:


[]1. Prednisone per PCP protocol to start and will discharge on 80 mg daily, 

titrate to 40 mg daily in 5 days. 


2. Follow up Dr. Rankin w/in one week of discharge.


3. Will send Cold agglutinins, cryoglobulins today.


4. Continue Folic Acid on discharge

## 2018-07-02 NOTE — CONS
CONSULTATION REPORT:

 

DATE OF CONSULT:  07/02/18

 

REQUESTING PHYSICIAN:  Dr. Pruett.

 

CONSULTING SERVICE:  Infectious Disease.

 

REASON FOR CONSULT:  Hypoxemia.

 

IMPRESSION:

1.  Hypoxemia with negative CT for pulmonary embolus or for pleural or 
parenchymal process with some low-grade fever in the setting of acquired immune 
deficiency syndrome, I think pneumocystis is highest on the list.  He had been 
on Bactrim prophylaxis, despite that most of the illness is consistent with 
peumocystis.

2.  Hemolytic anemia.  Question related to Bactrim or pneumocystis infection or 
other including Babesia at this time of year and he has had some travel.

3.  Cirrhosis.

4.  Hepatitic C, cured.

 

RECOMMENDATION:

1.  Change clindamycin to 300 mg by mouth 3 times a day, continue atovaquone 
750 mg by mouth twice a day for 3 more weeks.  He is on prednisone now for 
hemolytic anemia.  Defer to the hematology group for taper.

2.  Continue Triumeq and follow up with me in 1 to 2 weeks.

3.  Supplemental oxygen being set up for home.

 

HISTORY OF PRESENT ILLNESS:  This is a 43-year-old male with HIV, last CD4 
counts under 200, has been on Bactrim prophylaxis for a few months and has had 
2 to 3 weeks of dyspnea, nonproductive cough, chills, and sweats.  As an 
outpatient, about a week ago, he was started on prednisone and high-dose Bactrim
, felt initially better and then I think with the steroids taper, he started to 
feel ill again.  He was satting low 80s at rest, directed to the hospital on 
Friday.  CT showed no PE or parenchymal lung process, found to have hemoglobin 
9.6 and reticulocyte count of 10%, and he has been seen by Hematology, treated 
with prednisone.  His counts were up to 11 today.  He has had no fevers.  His 
breathing is better.  He is not desaturating at rest, just with exertion now, 
into the mid 80s with exertion.  D-dimer was negative.  His bilirubin was 3; it 
was up to 4.2 yesterday.  B12 was 270. Urinalysis negative.  Ferritin 250, iron 
121.

 

PAST MEDICAL HISTORY:

1.  AIDS.

2.  Hepatitis C, cured.

3.  Cirrhosis.

4.  Pneumocystis pneumonia in 2016.

 

MEDICATIONS:

1.  Triumeq once daily.

2.  Tylenol as needed.

3.  Atovaquone 750 mg by mouth twice a day.

4.  Clindamycin 600 mg every 8 hours.

5.  Folic acid.

6.  Lactobacillus.

7.  Prednisone 100 mg a day.

 

ALLERGIES:  PENICILLIN and DILAUDID.

 

FAMILY HISTORY:  No recurrent infections or tuberculosis.

 

SOCIAL HISTORY:  He lives in [place] with his .  He had been in [place] 
during the spring and recently drove back up to [place].  No injection drugs.

 

REVIEW OF SYSTEMS:  All negative except as noted above.

 

PHYSICAL EXAM:  Vital Signs:  Temperature is 37, heart rate is 60, respiratory 
rate 16, oxygen saturation is 87% on room air at rest, blood pressure 145/88.  
In general, he is awake, not in distress.  Neurologic:  He is oriented x3. 
Follows all commands. HEENT: There is no thrush. Neck: Supple without mass. 
Heart: Regular rate and rhythm without murmurs, rubs, or gallops.  Lungs:  
Clear to auscultation bilaterally.  Abdomen:  Soft, nontender, nondistended.  
There are bowel sounds present.  Skin:  There is no rash or splinter 
hemorrhages. Musculoskeletal:  No spine tenderness to palpation.

 

LABORATORY DATA:  White blood cell count 5, hemoglobin 11, , platelets 
of 125.  Creatinine 1.1.

 

Please see impressions and recommendations outlined above, which I have 
discussed with Dr. Pruett.

 

Thanks for asking me to see June Ramy in consultation.

 

 [id number]/CPS #: [id number]

MTDD

## 2019-08-04 NOTE — UC
Throat Pain/Nasal Art HPI





- HPI Summary


HPI Summary: 


sore throat for 3.5 weeks---thinks he may have sti in throat (has had before 

with similar sx)








- History of Current Complaint


Chief Complaint: UCGeneralIllness


Stated Complaint: SORE THROAT


Time Seen by Provider: 08/04/19 14:28


Hx Obtained From: Patient


Onset/Duration: Gradual Onset, Lasting Weeks - 3.5, Still Present


Pain Intensity: 4


Pain Scale Used: 0-10 Numeric


Cough: None


Associated Signs & Symptoms: Positive: Negative





- Allergies/Home Medications


Allergies/Adverse Reactions: 


 Allergies











Allergy/AdvReac Type Severity Reaction Status Date / Time


 


Penicillins Allergy  Unknown Verified 08/04/19 14:09





   Reaction  





   Details  


 


hydromorphone AdvReac  Nausea And Verified 08/04/19 14:09





   Vomiting  














PMH/Surg Hx/FS Hx/Imm Hx


Previously Healthy: No


Other History Of: HIV, Hepatitis C





- Surgical History


Surgical History: Yes


Surgery Procedure, Year, and Place: liver bx.  double hernia surgery





- Family History


Family History: NON CONTRIBUTORY





- Social History


Occupation: Disabled


Lives: With Family


Alcohol Use: None


Alcohol Amount: WEEKENDS


Substance Use Type: Marijuana


Substance Use Comment - Amount & Last Used: Socially


Smoking Status (MU): Never Smoked Tobacco





- Immunization History


Most Recent Influenza Vaccination: 10/2017


Most Recent Tetanus Shot: unknown


Most Recent Pneumonia Vaccination: never





Review of Systems


All Other Systems Reviewed And Are Negative: Yes


Constitutional: Positive: Negative


Skin: Positive: Negative


Eyes: Positive: Negative


ENT: Positive: Sore Throat


Respiratory: Positive: Negative


Cardiovascular: Positive: Negative


Gastrointestinal: Positive: Negative


Genitourinary: Positive: Negative


Motor: Positive: Negative


Neurovascular: Positive: Negative


Musculoskeletal: Positive: Negative


Neurological: Positive: Negative


Psychological: Positive: Negative


Is Patient Immunocompromised?: No





Physical Exam


Triage Information Reviewed: Yes


Appearance: Well-Appearing, No Pain Distress, Well-Nourished


Vital Signs: 


 Initial Vital Signs











Temp  98 F   08/04/19 14:02


 


Pulse  80   08/04/19 14:02


 


Resp  18   08/04/19 14:02


 


BP  146/100   08/04/19 14:02


 


Pulse Ox  100   08/04/19 14:02











Vital Signs Reviewed: Yes


Eye Exam: Normal


Eyes: Positive: Conjunctiva Clear


ENT Exam: Normal


ENT: Positive: Normal ENT inspection, Hearing grossly normal, Pharynx normal, 

TMs normal, Uvula midline.  Negative: Nasal congestion, Trismus, Muffled voice, 

Hoarse voice


Dental Exam: Normal


Neck exam: Normal


Neck: Positive: Supple, Nontender, No Lymphadenopathy


Respiratory Exam: Normal


Respiratory: Positive: Chest non-tender, Lungs clear, Normal breath sounds, No 

respiratory distress, No accessory muscle use


Cardiovascular Exam: Normal


Cardiovascular: Positive: RRR, No Murmur, Pulses Normal, Brisk Capillary Refill


Musculoskeletal Exam: Normal


Musculoskeletal: Positive: Strength Intact, ROM Intact, No Edema


Neurological Exam: Normal


Neurological: Positive: Alert, Muscle Tone Normal


Psychological Exam: Normal


Skin Exam: Normal





Diagnostics





- Laboratory


Lab Results: 





rst (-)





Throat Pain/Nasal Course/Dx





- Course


Course Of Treatment: 





gustabo/chly testing full throat culture advise not to hve oral sex without condom 

untill tests are resulted and negative follow with Dr. Blair in 2 weeks for 

retesting





- Differential Dx/Diagnosis


Provider Diagnosis: 


 Pharyngitis








Discharge





- Sign-Out/Discharge


Documenting (check all that apply): Patient Departure


All imaging exams completed and their final reports reviewed: No Studies





- Discharge Plan


Condition: Stable


Disposition: HOME


Patient Education Materials:  Condom Use (ED), Pharyngitis (ED)


Referrals: 


Mike ARIAS,Rod PRATT [Primary Care Provider] - 2 Weeks





- Billing Disposition and Condition


Condition: STABLE


Disposition: Home





- Attestation Statements


Provider Attestation: 





This patient was not seen by me.  I was available to consult. GLADYS

## 2020-01-13 ENCOUNTER — HOSPITAL ENCOUNTER (EMERGENCY)
Dept: HOSPITAL 25 - UCEAST | Age: 45
Discharge: HOME | End: 2020-01-13
Payer: MEDICARE

## 2020-01-13 VITALS — SYSTOLIC BLOOD PRESSURE: 141 MMHG | DIASTOLIC BLOOD PRESSURE: 87 MMHG

## 2020-01-13 DIAGNOSIS — H69.92: Primary | ICD-10-CM

## 2020-01-13 DIAGNOSIS — Z88.5: ICD-10-CM

## 2020-01-13 DIAGNOSIS — Z88.0: ICD-10-CM

## 2020-01-13 DIAGNOSIS — K74.60: ICD-10-CM

## 2020-01-13 PROCEDURE — 99212 OFFICE O/P EST SF 10 MIN: CPT

## 2020-01-13 PROCEDURE — G0463 HOSPITAL OUTPT CLINIC VISIT: HCPCS

## 2020-01-13 NOTE — UC
Throat Pain/Nasal Art HPI





- HPI Summary


HPI Summary: 





45 yo male presents with LEFT ear complaint. He tells me that for the last 6-8 

weeks he has noticed that his left ear feels muffled and like it needs to "pop"

. He describes it feeling similar to being on an airplane, but his ear never 

popping when he returns to ground. He has been using q-tips with no change. He 

denies headache, dizziness, fever, pain, sinus symptoms, sore throat, cough. 

Has not taken anything OTC. 





- History of Current Complaint


Stated Complaint: EAR ISSUES


Time Seen by Provider: 01/13/20 12:45


Hx Obtained From: Patient


Onset/Duration: Gradual Onset


Severity: Mild


Pain Intensity: 2


Pain Scale Used: 0-10 Numeric





- Allergies/Home Medications


Allergies/Adverse Reactions: 


 Allergies











Allergy/AdvReac Type Severity Reaction Status Date / Time


 


Penicillins Allergy  Unknown Verified 01/13/20 12:55





   Reaction  





   Details  


 


hydromorphone AdvReac  Nausea And Verified 01/13/20 12:55





   Vomiting  














PMH/Surg Hx/FS Hx/Imm Hx





- Additional Past Medical History


Additional PMH: 








Liver cirrhosis 


Other History Of: HIV, Hepatitis C





- Surgical History


Surgical History: Yes


Surgery Procedure, Year, and Place: liver bx.  double hernia surgery





- Family History


Family History: NON CONTRIBUTORY





- Social History


Lives: With Family


Alcohol Use: None


Alcohol Amount: WEEKENDS


Substance Use Type: Marijuana


Substance Use Comment - Amount & Last Used: Socially


Smoking Status (MU): Never Smoked Tobacco





- Immunization History


Most Recent Influenza Vaccination: 10/2017


Most Recent Tetanus Shot: unknown


Most Recent Pneumonia Vaccination: never





Review of Systems


All Other Systems Reviewed And Are Negative: No


Constitutional: Positive: Negative


Skin: Positive: Negative


Eyes: Positive: Negative


ENT: Positive: Ear Ache


Respiratory: Positive: Negative


Cardiovascular: Positive: Negative


Gastrointestinal: Positive: Negative


Neurological: Positive: Negative


Psychological: Positive: Negative





Physical Exam





- Summary


Physical Exam Summary: 





GENERAL: NAD. WDWN. No pain distress.


SKIN: No rashes, sores, lesions, or open wounds.


HEENT:


            Head: AT/NC


            Eyes: EOM intact. Conjunctiva clear without inflammation or 

discharge.


            Ears: Hearing grossly normal. TMs intact, no bulging or edema. LEFT 

EAR TM with faint erythema and inflammation. 


            Nose: Nasal mucosa pink and moist. NTTP maxillary and frontal 

sinus. 


            Throat: Posterior oropharynx without exudates, erythema, or 

tonsillar enlargement.  Uvula midline.


NECK: Supple. Nontender. No lymphadenopathy. 


CHEST:  CTAB. No r/r/w. No accessory muscle use. Breathing comfortably and in 

no distress.


CV:  RRR. Pulses intact. Cap refill <2seconds


NEURO: Alert. 


PSYCH: Age appropriate behavior.


Triage Information Reviewed: Yes


Vital Signs: 





Vital Signs:











Temp Pulse Resp BP Pulse Ox


 


 98.9 F   77   18   141/87   96 


 


 01/13/20 12:56  01/13/20 12:56  01/13/20 12:56  01/13/20 12:56  01/13/20 12:56











Vital Signs Reviewed: Yes





Throat Pain/Nasal Course/Dx





- Course


Course Of Treatment: 





Possible eustachian tube dysfunction, will try him with flonase and claritin 

and have him f/u with ENT if no relief. 





- Differential Dx/Diagnosis


Provider Diagnosis: 


 Eustachian tube dysfunction








Discharge ED





- Sign-Out/Discharge


Documenting (check all that apply): Patient Departure


All imaging exams completed and their final reports reviewed: No Studies





- Discharge Plan


Condition: Stable


Disposition: HOME


Prescriptions: 


Fluticasone NASAL SPRAY 50MCG* [Flonase NASAL SPRAY 50MCG*] 2 spray BOTH NARES 

DAILY #1 btl


Loratadine [Claritin] 10 mg PO DAILY #30 tablet


Patient Education Materials:  Serous Otitis Media (ED)


Referrals: 


Mike ARIAS,Rod PRATT [Primary Care Provider] - 


Nelson Mcguire MD [Medical Doctor] - If Needed


Additional Instructions: 


If you develop a fever, shortness of breath, chest pain, new or worsening 

symptoms - please call your PCP or go to the ED immediately.


 


Your ear appears to have some mild inflammation today, but is otherwise 

healthy. 





Your symptoms could be due to eustachian tube dysfunction, which should improve 

with nasal spray and antihistamines. If you do not find relief with these, I 

recommend that you call ENT at the number below to schedule an appointment for 

further evaluation.





- Billing Disposition and Condition


Condition: STABLE


Disposition: Home

## 2020-01-13 NOTE — XMS REPORT
Continuity of Care Document (CCD)

 Created on:December 3, 2019



Patient:Abner Mccann

Sex:Male

:1975

External Reference #:[id number]





Demographics







 Address  [address] [place] [address]

 

 Mobile Phone  [phone]

 

 Email Address  [email or web address]

 

 Preferred Language  en

 

 Marital Status  Not  or 

 

 Synagogue Affiliation  Unknown

 

 Race  White

 

 Ethnic Group  Not  or 









Author







 Name  Rod Monk M.D. (transmitted by agent of provider Lina Chappell
)

 

 Address  [address] [place] [id number]









Care Team Providers







 Name  Role  Phone

 

 Rod Monk MD - Infectious  Care Team Information   [phone]
[phone]



 Disease    









Problems







 Active Problems  Provider  Date

 

 Human immunodeficiency virus infection  Rod Monk M.D.  Onset: 

 

 Chronic hepatitis C  Rod Monk M.D.  Onset: 2016

 

 Cirrhosis - non-alcoholic  Rod Monk M.D.  Onset: 2016

 

 Low back pain  Vito Carrera MD  Onset: 2017

 

 Displacement of lumbar intervertebral  Vito Carrera MD  Onset: 2017



 disc without myelopathy    

 

 Lumbar spondylosis  Vito Carrera MD  Onset: 2017

 

 Acute respiratory failure with hypoxia  Guerita Pruett M.D.  Onset: 2018

 

 Cannabis abuse  Guerita Pruett M.D.  Onset: 2018

 

 Anemia  Guerita Pruett M.D.  Onset: 2018

 

 Hypoxemia  Guerita Pruett M.D.  Onset: 2018

 

 Thrombocytopenic disorder  Guerita Pruett M.D.  Onset: 2018

 

 Viral hepatitis C  Guerita Pruett M.D.  Onset: 2018







Social History







 Type  Date  Description  Comments

 

 Birth Sex    Unknown  

 

 Tobacco Use  Start: Unknown  Never Smoked Cigarettes  

 

 Smoking Status  Reviewed: 19  Never Smoked Cigarettes  

 

 ETOH Use    Rarely consumes alcohol  

 

 Tobacco Use  Start: Unknown  Patient has never smoked  

 

 Recreational Drug Use    Sporadically uses Marijuana  

 

 Exercise Type/Frequency    Does not exercise  







Allergies, Adverse Reactions, Alerts







 Active Allergies  Reaction  Severity  Comments  Date

 

 Penicillin  childhood reaction      05/10/2016

 

 Dilaudid  Nausea and Vomiting      05/10/2016







Medications







 Active Medications  SIG  Qnty  Indications  Ordering  Date



         Provider  

 

 Edmond  1 by mouth every  30tabs  B20  Rod DJune  2019



       50-25mg  day      LUCILLE Monk  



 Tablets          



           

 

 Nebulizer  1 unit every 4    J45.909  Haylee  2019



           Device  hours, as needed      FABY Song  



           

 

 Nebulizer  use every 4 hours    J45.909  Haylee  2019



 Kit/Tubing/Mouthpiec  as instructed      FABY Song  



 e          



   Kit          



           

 

 Xopenex  use every 4-6 hours  36ml  J45.909  Rod DJune  2019



        0.63mg/3ML  as needed      LUCILLE Monk  



 Nebulizer          



           

 

 Mandibular  please fabricate    G47.33  Haylee  2019



 Advancement Device  mandibular advance      FABY Song  



   device for mild        



 Device  sleep apnea        



           

 

 Atovaquone  1500 mg daily  630ml  B59  Rod PRATT  2018



           750mg/5ML        LUCILLE Monk  



 Suspension          



           

 

 Proair HFA  2 inhalations every  17gm  R09.02  Rod PRATT  2018



   4-6 hours as needed      LUCILLE Monk  



 108(90Base) mcg/Act          



 Aerosol          



           

 

 Ibuprofen  by mouth three  90tabs    Rod D.  2017



          800mg  times a day      LUCILLE Monk  



 Tablets          



           

 

 Ketoconazole  apply once a day to  120ml    Rod PRATT  



             2%  the scalp and scott      LUCILLE Monk  



 Shampoo          



           

 

 Brimonidine Tartrate  Apply 1-2 Drops To      Unknown  



   Face In The Morning        



 0.2% Solution  as Needed For        



   Redness        









 History Medications









 Azithromycin  2 by mouth  2tabs  J02.9  Rod PRATT  2019 -



           500mg  once      LUCILLE Monk  10/22/2019



 Tablets          



           







Medications Administered in Office







 Medication  SIG  Qnty  Indications  Ordering Provider  Date

 

 Injection Ceftriaxone Sodium        Rod Monk M.D.  2019



 Per 250 MG (Rocephin)          



               Injection          



           

 

 Toradol Injection 15MG        Nurse Visit Id  2019



                Injection          



           







Immunizations







 CPT Code  Status  Date  Vaccine  Reaction  Lot #

 

 [id number]  Given  10/23/2019  Pneumonia Vaccine    [id number]

 

 15675  Given  10/23/2019  Influenza Virus Vaccine,    [id number]



       Quadrivalent, Split,    



       Preservative Free    

 

 43260  Given  10/11/2018  Influenza Virus Vaccine,    [id number]



       Quadrivalent, Split,    



       Preservative Free    

 

 21692  Given  2018  Hepatitis B Vaccine Adult    [id number]



       Dosage    

 

 30495  Given  2018  Hepatitis B Vaccine Adult    [id number]



       Dosage    

 

 11642  Given  2017  Influenza Virus Vaccine,    [id number]



       Quadrivalent, Split,    



       Preservative Free    

 

 58026  Given  10/21/2016  Pneumococcal Conjugate  no immediate reaction  [id number]



       Vaccine 13 Valent For  noted  



       Intramuscular Use    

 

 43492  Given  2016  Influenza Virus Vaccine,  no immediate reaction  
[id number]



       Quadrivalent, Split,  noted  



       Preservative Free    







Vital Signs







 Date  Vital  Result  Comment

 

 2019 12:51pm  Height  70 inches  5'10"









 Weight  244.50 lb  

 

 Heart Rate  60 /min  

 

 BP Systolic Sitting  134 mmHg  

 

 BP Diastolic Sitting  90 mmHg  

 

 Respiratory Rate  14 /min  

 

 Body Temperature  98.7 F  

 

 O2 % BldC Oximetry  96 %  

 

 BMI (Body Mass Index)  35.1 kg/m2  









 10/23/2019  8:47am  Height  70 inches  5'10"









 Weight  274.00 lb  

 

 Heart Rate  64 /min  

 

 BP Systolic Sitting  152 mmHg  

 

 BP Diastolic Sitting  94 mmHg  

 

 Respiratory Rate  14 /min  

 

 Body Temperature  98.5 F  

 

 BMI (Body Mass Index)  39.3 kg/m2  







Results







 Test  Acquired  Facility  Test  Result  H/L  Range  Note



   Date            

 

 HIV-1 Rna QNT  10/04/2019  [facility]  HIV-1 Rna  28  Abnormal  
Undetected  1



 By PCR Sli    [address]  (PCR)  copies/mL      



     [place] [address]          



     [phone]          

 

 GC/Chlamydia  2019  [facility]  Chlamydia  Negative    
Negative  



 Amplified Rna    [address]  trachomatis        



     [place] [address]  Mindy        



     [phone]          









 Neisseria gonorrhoeae (GC) Mindy  Negative    Negative  









 Ctrach&Ngonorr Amplified Rna  2019  [facility]  Source  
throat      



     [address]          



     [place] [address]          



     [phone]          









 C. trach Amplified Rna  Negative    Negative  2

 

 Source  thorat      

 

 N Gonorr Amplified Rna  Negative    Negative  3









 CBC Auto  2019  [facility]  White Blood  5.2 10^3/uL  Normal  
3.5-10.8  



 Diff    [address]  Count        



     [place] [address] [phone]          









 Red Blood Count  4.93 10^6/uL  Normal  4.18-5.48  

 

 Hemoglobin  14.6 g/dL  Normal  14.0-18.0  

 

 Hematocrit  43 %  Normal  42-52  

 

 Mean Corpuscular Volume  87 fL  Normal  80-94  

 

 Mean Corpuscular Hemoglobin  30 pg  Normal  27-31  

 

 Mean Corpuscular HGB Conc  34 g/dL  Normal  31-36  

 

 Red Cell Distribution Width  15 %  Normal  10-15  

 

 Platelet Count  132 10^3/uL  Low  150-450  

 

 Mean Platelet Volume  7.9 fL  Normal  7.4-10.4  

 

 Abs Neutrophils  3.8 10^3/uL  Normal  1.5-7.7  

 

 Abs Lymphocytes  1.0 10^3/uL  Normal  1.0-4.8  

 

 Abs Monocytes  0.3 10^3/uL  Normal  0-0.8  

 

 Abs Eosinophils  0.0 10^3/uL  Normal  0-0.6  

 

 Abs Basophils  0.0 10^3/uL  Normal  0-0.2  

 

 Abs Nucleated RBC  0.0 10^3/uL      

 

 Granulocyte %  74.1 %      

 

 Lymphocyte %  18.9 %      

 

 Monocyte %  6.5 %      

 

 Eosinophil %  0.3 %      

 

 Basophil %  0.2 %      

 

 Nucleated Red Blood Cells %  0.1      









 Comp Metabolic  2019  [facility]  Sodium  139 mmol/L  Normal  
135-145  



 Panel    [address]          



     [place] [address] [phone]          









 Potassium  4.4 mmol/L  Normal  3.5-5.0  

 

 Chloride  102 mmol/L  Normal  101-111  

 

 Co2 Carbon Dioxide  31 mmol/L  Normal  22-32  

 

 Anion Gap  6 mmol/L  Normal  2-11  

 

 Glucose  110 mg/dL  High    

 

 Blood Urea Nitrogen  20 mg/dL  Normal  6-24  

 

 Creatinine  1.12 mg/dL  Normal  0.67-1.17  

 

 BUN/Creatinine Ratio  17.9  Normal  8-20  

 

 Calcium  9.9 mg/dL  Normal  8.6-10.3  

 

 Total Protein  7.2 g/dL  Normal  6.4-8.9  

 

 Albumin  4.7 g/dL  Normal  3.2-5.2  

 

 Globulin  2.5 g/dL  Normal  2-4  

 

 Albumin/Globulin Ratio  1.9  Normal  1-3  

 

 Total Bilirubin  0.80 mg/dL  Normal  0.2-1.0  

 

 Alkaline Phosphatase  49 U/L  Normal    

 

 Alt  25 U/L  Normal  7-52  

 

 Ast  25 U/L  Normal  13-39  

 

 Egfr Non-  71.9    >60  

 

 Egfr   87.0    >60  4









 HIV-1 Rna QNT  2019  [facility]  HIV-1 Rna  Undetected    
Undetected  5



 By PCR Sli    [address]  (PCR)  copies/mL      



     [place] [address]          



     [phone]          

 

 Laboratory  2019  [facility]  Syphillis  Positive  Abnormal  
Negative  



 test finding    [address]  Igg        



     [place] [address]  W/Reflex        



     [phone]  RPR        









 Rapid Plasma Reagin (RPR)  Nonreactive    Nonreactive  









 T&B Cell  2019  [facility]  CD45  0.77  Abnormal  0.82-2.84  



 Lymphocyte    [address]  Total  thou/mcL      



 [place] [address]  Lymph        



     [phone]  Count        









 Percent CD3 Cells (T Cells)  75 %    58-86  

 

 Percent CD19 Cells (B Cells)  10 %    6-24  

 

 % CD16+CD56 Cells (NK Cells)  15 %    4-28  

 

 Percent CD4 Cell (T Cell)  15 %  Abnormal  32-64  

 

 Percent CD8 Cells (T Cells)  52 %  Abnormal  13-40  

 

 CD3 (T Cells)  579 cells/L    550-2202  

 

 CD19 (B Cells)  74 cells/L      

 

 CD16+CD56 Count (NK cells)  113 cells/L      

 

 CD4 (T Cell)  113 cells/L  Abnormal  365-1437  

 

 CD8 (T Cell)  402 cells/L    145-846  

 

 4/8 Ratio  0.3  Abnormal  >=0.9  

 

 T B Cell Comment  See Comment      6









 Laboratory  2019  [facility]  Fta-Abs  Positive  Abnormal  
Negative  7



 test finding    [address]          



     [place], [place] [address] [phone]          

 

 Laboratory  2019  [facility]  Culture  SEE RESULT      8, 9



 test finding    [address]  Throat  BELOW      



     [place], [place] [address] [phone]          

 

 Ctrach&Ngonorr  2019  [facility]  Source  throat      



 Amplified Rna    [address]          



     [place], [place] [address] [phone]          









 C. trach Amplified Rna  Negative    Negative  10

 

 Source  throat      

 

 N Gonorr Amplified Rna  Positive  Abnormal  Negative  11









 Laboratory test  2019  [facility]  Rapid Strep  Negative    
Negative  12



 finding    [address] [place] [address] [phone]          









 1  Result in log copies/mL is 1.44.



   -------------------ADDITIONAL INFORMATION-------------------



   The quantification range of this assay is 20 to 10,000,000



   copies/mL (1.30 log to 7.00 log copies/mL). Testing was



   performed using the caleb HIV-1 test (Roche [employer]



   [employer] with the caleb Citrix Online0 System.



   This test has been modified from the 's



   instructions. Its performance characteristics were



   determined by [facility] in a manner consistent with CLIA



   requirements. This test has not been cleared or approved by



   the U.S. Food and Drug Administration.



   Test Performed by:



   [facility] - [address] [address]



   : Marty Trammell M.D. Ph.D.; CLIA# [id number]

 

 2  -------------------ADDITIONAL INFORMATION-------------------



   This report is intended for use in clinical monitoring and



   management of patients.  It is not intended for use in



   medical-legal applications.



   This test has been modified from the 's



   instructions.  Its performance characteristics were



   determined by [facility] in a manner consistent with CLIA



   requirements.  This test has not been cleared or approved



   by the U.S. Food and Drug Administration.

 

 3  -------------------ADDITIONAL INFORMATION-------------------



   This report is intended for use in clinical monitoring and



   management of patients.  It is not intended for use in



   medical-legal applications.



   This test has been modified from the 's



   instructions.  Its performance characteristics were



   determined by [facility] in a manner consistent with CLIA



   requirements.  This test has not been cleared or approved



   by the U.S. Food and Drug Administration.



   Test Performed by:



   [facility] - [address] [address]

 

 4  *******Because ethnic data is not always readily available,



   this report includes an eGFR for both -Americans and



   non- Americans.****



   The National Kidney Disease Education Program (NKDEP) does



   not endorse the use of the MDRD equation for patients that



   are not between the ages of 18 and 70, are pregnant, have



   extremes of body size, muscle mass, or nutritional status,



   or are non- or non-.



   According to the National Kidney Foundation, irrespective of



   diagnosis, the stage of the disease is based on the level of



   kidney function:



   Stage Description                      GFR(mL/min/1.73 m(2))



   1     Kidney damage with normal or decreased GFR       90



   2     Kidney damage with mild decrease in GFR          60-89



   3     Moderate decrease in GFR                         30-59



   4     Severe decrease in GFR                           15-29



   5     Kidney failure                       <15 (or dialysis)

 

 5  Result in log copies/mL is Undetected.



   -------------------ADDITIONAL INFORMATION-------------------



   The quantification range of this assay is 20 to 10,000,000



   copies/mL (1.30 log to 7.00 log copies/mL). Testing was



   performed using the caleb HIV-1 test (Roche [employer]



   [employer] with the caleb 6800 System.



   This test has been modified from the 's



   instructions. Its performance characteristics were



   determined by [facility] in a manner consistent with CLIA



   requirements. This test has not been cleared or approved by



   the U.S. Food and Drug Administration.



   Test Performed by:



   [facility]



   [address] [place] [id number]

 

 6  RESULT: Reviewed by: Travis Hillman M.D.



   -------------------ADDITIONAL INFORMATION-------------------



   Reference values implemented 2013.



   This test was developed using an analyte specific reagent.



   Its performance characteristics were determined by [facility] in a manner consistent with CLIA requirements. This



   test has not been cleared or approved by the U.S. Food and



   Drug Administration.



   Test Performed by:



   [facility] - [address] [address]

 

 7  Result suggests infection with T. pallidum at some time in



   the past, but does not distinguish between treated and



   untreated syphilis as treponemal antibodies can remain



   elevated despite proper treatment.  RPR testing is



   recommended to distinguish between treated and untreated



   syphilis.



   -------------------ADDITIONAL INFORMATION-------------------



   This test is intended to be used as a confirmatory test on



   samples that have been tested by another syphilis test.



   Test Performed by:



   [facility]



   [address] [place] [address]

 

 8  [id number]

 

 9  SEE RESULT BELOW



   -----------------------------------------------------------------------------
---------------



   Name:  ABNER MCCANN                    : 1975    Attend Dr: Josh Nuñez MD



   Acct:  [id number]  Unit: [id number]  AGE: 44            Location:  [facility]



   Re19                        SEX: M             Status:    DEP ER



   -----------------------------------------------------------------------------
---------------



   



   SPEC: [id number]         AMBERLY:       [facility] DR: Lela Collins NP



   REQ:  [id number]              RECD:   



   STATUS: PRIYA JACOB DR: Rod Nuñez MD



   _



   SOURCE: THROAT         SPDESC:



   ORDERED:  Throat Culture



   COMMENTS: [id number]



   



   -----------------------------------------------------------------------------
---------------



   Procedure                         Result                         Reported   
        Site



   -----------------------------------------------------------------------------
---------------



   Throat Culture  Final                                            19-
839      ML



   



   Organism 1                     NORMAL FERNANDO



   Quantity                    3+



   



   Throat cultures are clinically indicated to detect the



   presence of group A strep, arcanobacterium and yeast. In



   certain cases, predominating organisms will be reported.



   



   -----------------------------------------------------------------------------
---------------



   * ML - Main Lab



   .



   



   



   



   



   



   



   



   



   



   



   



   



   



   



   



   



   



   



   



   ** END OF REPORT **



   



   DEPARTMENT OF PATHOLOGY,  [address] [place] [address]



   Phone # [phone]      Fax #[phone]



   Misael Colbert M.D. Director     [facility] # [id number]

 

 10  -------------------ADDITIONAL INFORMATION-------------------



   This report is intended for use in clinical monitoring and



   management of patients.  It is not intended for use in



   medical-legal applications.



   This test has been modified from the 's



   instructions.  Its performance characteristics were



   determined by [facility] in a manner consistent with CLIA



   requirements.  This test has not been cleared or approved



   by the U.S. Food and Drug Administration.

 

 11  -------------------ADDITIONAL INFORMATION-------------------



   This report is intended for use in clinical monitoring and



   management of patients.  It is not intended for use in



   medical-legal applications.



   This test has been modified from the 's



   instructions.  Its performance characteristics were



   determined by [facility] in a manner consistent with CLIA



   requirements.  This test has not been cleared or approved



   by the U.S. Food and Drug Administration.



   Test Performed by:



   [address] [address]

 

 12  : [id number]







Procedures







 Date  Code  Description  Status

 

 2019  66861  Admin Of Inj  Completed

 

 2019  54527  Admin Of Inj  Completed







Medical Devices







 Description

 

 No Information Available







Encounters







 Type  Date  Location  Provider  Dx  Diagnosis

 

 Office Visit  10/23/2019  [facility]  Rod PRATT  B20  Human 
immunodeficiency



   9:10a  Infectious  LUCILLE Monk    virus [HIV] disease



     Diseases      









 K74.69  Other cirrhosis of liver

 

 R21  Rash and other nonspecific skin eruption

 

 Z23  Encounter for immunization

 

 Z13.220  Encounter for screening for lipoid disorders









 Office Visit  2019  [facility]  Rod STUART  Human 
immunodeficiency



   1:20p  For Infectious  LUCILLE Monk    virus [HIV] disease



     Diseases      









 K74.69  Other cirrhosis of liver

 

 J02.9  Acute pharyngitis, unspecified

 

 Z11.3  Encntr screen for infections w sexl mode of transmiss







Assessments







 Date  Code  Description  Provider

 

 2019  R05  Cough  Rod Monk M.D.

 

 2019  B20  Human immunodeficiency virus [HIV]  Rod Monk M.D.



     disease  

 

 10/23/2019  B20  Human immunodeficiency virus [HIV]  Rod Monk M.D.



     disease  

 

 10/23/2019  K74.69  Other cirrhosis of liver  Rod Monk M.D.

 

 10/23/2019  R21  Rash and other nonspecific skin eruption  Rod Monk M.D.

 

 10/23/2019  Z23  Encounter for immunization  Rod Monk M.D.

 

 10/23/2019  Z13.220  Encounter for screening for lipoid  Rod Monk M.D.



     disorders  

 

 2019  B20  Human immunodeficiency virus [HIV]  Rod Monk M.D.



     disease  

 

 2019  K74.69  Other cirrhosis of liver  Rod Monk M.D.

 

 2019  J02.9  Acute pharyngitis, unspecified  Rod Monk M.D.

 

 2019  Z11.3  Encounter for screening for infections  Rod Monk M.D.



     with a predominantly sexual mode of  



     transmission  

 

 2019  M54.5  Low back pain  Nurse Visit Id







Plan of Treatment

Future Appointment(s):2020  1:20 pm - Rod Monk M.D. at [facility] Edjqznot10/03/2019 - Rod Monk M.D.R05 
[id number] Human immunodeficiency virus [HIV] disease



Functional Status







 Description

 

 No Information Available







Mental Status







 Description

 

 No Information Available







Referrals







 Description

 

 No Information Available

## 2020-02-26 ENCOUNTER — HOSPITAL ENCOUNTER (EMERGENCY)
Dept: HOSPITAL 25 - ED | Age: 45
Discharge: HOME | End: 2020-02-26
Payer: MEDICARE

## 2020-02-26 VITALS — DIASTOLIC BLOOD PRESSURE: 86 MMHG | SYSTOLIC BLOOD PRESSURE: 150 MMHG

## 2020-02-26 DIAGNOSIS — Z88.5: ICD-10-CM

## 2020-02-26 DIAGNOSIS — Z79.899: ICD-10-CM

## 2020-02-26 DIAGNOSIS — M54.5: Primary | ICD-10-CM

## 2020-02-26 DIAGNOSIS — Z88.0: ICD-10-CM

## 2020-02-26 DIAGNOSIS — Z21: ICD-10-CM

## 2020-02-26 PROCEDURE — 96372 THER/PROPH/DIAG INJ SC/IM: CPT

## 2020-02-26 PROCEDURE — 99282 EMERGENCY DEPT VISIT SF MDM: CPT

## 2020-02-26 NOTE — ED
Back Pain





- HPI Summary


HPI Summary: 


44 year old M presenting to [facility] alone complains of severe back pain since 

this morning following a sneeze while in an arched position. The patient 

reports falling to the floor due to pain following the sneeze and states he can'

t walk because of it. Patient denies incontinence, fever, chills, and any 

numbness. PMHx of L1 and L2 herniated discs at the bottom in 2018 as well has 

HIV but has been undetectable for twenty years. AIDS diagnosis in 2001 noted. 

He received a cortisone shot into the spine and tramadol in the past for his 

back. SHx of hernia repair.  The patient rates the pain 6/10 in severity. 

Symptoms aggravated by movement. Symptoms alleviated by nothing. Medications 

reviewed. Allergies noted. 


 Home Medications











 Medication  Instructions  Recorded  Confirmed  Type


 


Acetaminophen TAB* [Tylenol TAB*] 650 mg PO Q4H PRN 02/26/20 02/26/20 History


 


Dolutegravir/Rilpivirine [Juluca 1 tab PO DAILY 02/26/20 02/26/20 History





50-25 mg]    


 


Methocarbamol TAB* [Robaxin 500  mg PO TID PRN #12 tab 02/26/20  Rx





TAB*]    


 


predniSONE [Prednisone 20 MG TAB] 40 mg PO QAM 4 Days #8 tablet 02/26/20  Rx

















- History of Current Complaint


Chief Complaint: EDBackInjuryPain


Stated Complaint: SEVERE BACK PAIN PER PT


Time Seen by Provider: 02/26/20 11:04


Hx Obtained From: Patient


Onset/Duration: Sudden Onset, Still Present


Onset/Duration: Started Hours Ago, Still Present


Timing: Constant


Back Pain Location: Is Diffuse - lower


Severity Currently: Severe


Pain Intensity: 8


Pain Scale Used: 0-10 Numeric


Aggravating Symptom(s): Movement


Alleviating Symptom(s): Nothing





- Allergies/Home Medications


Allergies/Adverse Reactions: 


 Allergies











Allergy/AdvReac Type Severity Reaction Status Date / Time


 


Penicillins Allergy  Unknown Verified 02/26/20 09:09





   Reaction  





   Details  


 


hydromorphone AdvReac  Nausea And Verified 02/26/20 09:09





   Vomiting  











Home Medications: 


 Home Medications





Acetaminophen TAB* [Tylenol TAB*] 650 mg PO Q4H PRN 02/26/20 [History Confirmed 

02/26/20]


Dolutegravir/Rilpivirine [Juluca 50-25 mg] 1 tab PO DAILY 02/26/20 [History 

Confirmed 02/26/20]


Methocarbamol TAB* [Robaxin 500 MG TAB*] 500 mg PO TID PRN #12 tab 02/26/20 [Rx]


predniSONE [Prednisone 20 MG TAB] 40 mg PO QAM 4 Days #8 tablet 02/26/20 [Rx]











PMH/Surg Hx/FS Hx/Imm Hx


Endocrine/Hematology History: 


   Denies: Hx Diabetes


Cardiovascular History: 


   Denies: Hx Congestive Heart Failure, Hx Hypertension, Hx Pacemaker/ICD


Respiratory History: 


   Denies: Hx Asthma


GI History: Reports: Hx Cirrhosis


 History: 


   Denies: Hx Renal Disease


Sensory History: 


   Denies: Hx Contacts or Glasses, Hx Hearing Aid


Opthamlomology History: 


   Denies: Hx Contacts or Glasses


Psychiatric History: Reports: Hx Depression


   Denies: Hx Panic Disorder





- Surgical History


Surgery Procedure, Year, and Place: liver bx.  double hernia surgery


Infectious Disease History: No


Infectious Disease History: Reports: Hx Hepatitis, Hx Human Immunodeficiency 

Virus (HIV)


   Denies: History Other Infectious Disease, Traveled Outside the [place] in Last 30 

Days





- Family History


Known Family History: 


   Negative: Diabetes


Family History: NON CONTRIBUTORY





- Social History


Alcohol Use: None


Alcohol Amount: WEEKENDS


Hx Substance Use: Yes


Substance Use Type: Reports: Marijuana


Substance Use Comment - Amount & Last Used: Socially


Hx Tobacco Use: No


Smoking Status (MU): Never Smoked Tobacco





Review of Systems


Negative: Fever, Chills


Negative: incontinence


Positive: Other - positive - back pain


Negative: Numbness


All Other Systems Reviewed And Are Negative: Yes





Physical Exam





- Summary


Physical Exam Summary: 


Constitutional: Well-developed, Well-nourished, Alert. (-) Distressed


Skin: Warm, Dry


HENT: Normocephalic; Atraumatic


Eyes: Conjunctiva normal


Neck: Musculoskeletal ROM normal neck. (-) JVD, (-) Stridor, (-) Tracheal 

deviation


Cardio: Rhythm regular, rate normal, Heart sounds normal; Intact distal pulses; 

Radial pulses are 2+ and symmetric. (-) Murmur


Pulmonary/Chest wall: Effort normal. (-) Respiratory distress, (-) Wheezes, (-) 

Rales


Abd: Soft, (-) tenderness, (-) Distension, (-) Guarding, (-) Rebound


Musculoskeletal: midline lower lumbar tenderness, can sit up with difficulty, 5/

5 strength in lower extremities, limited ROM, sensation intact 


Lymph: (-) Cervical adenopathy


Neuro: Alert, Oriented x3


Psych: Mood and affect Normal








Triage Information Reviewed: Yes


Vital Signs On Initial Exam: 


 Initial Vitals











Temp Pulse Resp BP Pulse Ox


 


 98.7 F   72   20   185/96   96 


 


 02/26/20 09:07  02/26/20 09:07  02/26/20 09:07  02/26/20 09:07  02/26/20 09:07











Vital Signs Reviewed: Yes





Procedures





- Sedation


Patient Received Moderate/Deep Sedation with Procedure: No





Diagnostics





- Vital Signs


 Vital Signs











  Temp Pulse Resp BP Pulse Ox


 


 02/26/20 09:07  98.7 F  72  20  185/96  96














- Laboratory


Lab Statement: Any lab studies that have been ordered have been reviewed, and 

results considered in the medical decision making process.





Back Pain Course/Dx





- Course


Course Of Treatment: Patient is here with lower back pain that occurred while 

seizing.  Patient had similar back pain 2 years ago where he was diagnosed with 

an L1/L2 herniated disc.  Patient has no red flag symptoms of back pain.  

Patient has sensation intact on exam, 5 out 5 strength in his bilateral lower 

extremity is.  Patient has worse symptoms when he is walking.  Patient received 

prednisone, Robaxin, lidocaine patch, shot of Toradol, tramadol for his 

symptoms.  Patient was initially able to walk outside of the emergency 

department and then became angry.  The patient was wheeled out in a wheelchair 

by his .  Patient was discharged with a prescription for a prednisone 

course, Robaxin





- Diagnoses


Provider Diagnoses: 


 Lower back pain








Discharge ED





- Sign-Out/Discharge


Documenting (check all that apply): Patient Departure - discharge 





- Discharge Plan


Condition: Stable


Disposition: HOME


Prescriptions: 


Methocarbamol TAB* [Robaxin 500 MG TAB*] 500 mg PO TID PRN #12 tab


 PRN Reason: muscle spasm


predniSONE [Prednisone 20 MG TAB] 40 mg PO QAM 4 Days #8 tablet


Patient Education Materials:  Low Back Strain (ED)


Referrals: 


Vito Carrera MD [Medical Doctor] - 


Additional Instructions: 


Please follow up with  within 3 days. Come back to the ED if you 

have any difficulty going to the bathroom, go during unexpected times, or if 

you experience any numbness. Take Motrin and Tylenol for pain, use over-the-

counter back patches, and continue taking any prescribed medicines. 





- Billing Disposition and Condition


Condition: STABLE


Disposition: Home





- Attestation Statements


Document Initiated by Scribe: Yes


Documenting Scribe: Jese Rios


Provider For Whom Frederic is Documenting (Include Credential): Mark Wisdom MD


Scribe Attestation: 


Jese SINGH, scribed for Mark Wisdom MD on 02/26/20 at 1848. 


Scribe Documentation Reviewed: Yes


Provider Attestation: 


The documentation as recorded by the Jese charles accurately 

reflects the service I personally performed and the decisions made by me, Mark Wisdom MD


Status of Scribe Document: Viewed

## 2020-02-26 NOTE — XMS REPORT
Continuity of Care Document (CCD)

 Created on:2020



Patient:Gilberto Mccann

Sex:Male

:1975

External Reference #:[id number]





Demographics







 Address  [address]



   [place] [address]

 

 Mobile Phone  [phone]

 

 Email Address  [email or web address]

 

 Preferred Language  en

 

 Marital Status  Not  or 

 

 Scientology Affiliation  Unknown

 

 Race  White

 

 Ethnic Group  Not  or 









Author







 Name  Rod Monk M.D. (transmitted by agent of provider Lina Chappell
)

 

 Address  [address] [place] [address]









Care Team Providers







 Name  Role  Phone

 

 Rod Monk MD - Infectious  Care Team Information   [phone]
[phone]



 Disease    









Problems







 Active Problems  Provider  Date

 

 Human immunodeficiency virus infection  Rod Monk M.D.  Onset: 

 

 Chronic hepatitis C  Rod Monk M.D.  Onset: 2016

 

 Cirrhosis - non-alcoholic  Rod Monk M.D.  Onset: 2016

 

 Low back pain  Vito Carrera MD  Onset: 2017

 

 Displacement of lumbar intervertebral  Vito Carrera MD  Onset: 2017



 disc without myelopathy    

 

 Lumbar spondylosis  Vito Carrera MD  Onset: 2017

 

 Acute respiratory failure with hypoxia  Guerita Pruett M.D.  Onset: 2018

 

 Cannabis abuse  Guerita Pruett M.D.  Onset: 2018

 

 Anemia  Guerita Pruett M.D.  Onset: 2018

 

 Hypoxemia  Guerita Pruett M.D.  Onset: 2018

 

 Thrombocytopenic disorder  Guerita Pruett M.D.  Onset: 2018

 

 Viral hepatitis C  Guerita Pruett M.D.  Onset: 2018







Social History







 Type  Date  Description  Comments

 

 Birth Sex    Unknown  

 

 Tobacco Use  Start: Unknown  Never Smoked Cigarettes  

 

 Smoking Status  Reviewed: 20  Never Smoked Cigarettes  

 

 ETOH Use    Rarely consumes alcohol  

 

 Tobacco Use  Start: Unknown  Patient has never smoked  

 

 Recreational Drug Use    Sporadically uses Marijuana  

 

 Exercise Type/Frequency    Does not exercise  







Allergies, Adverse Reactions, Alerts







 Active Allergies  Reaction  Severity  Comments  Date

 

 Penicillin  childhood reaction      05/10/2016

 

 Dilaudid  Nausea and Vomiting      05/10/2016







Medications







 Active Medications  SIG  Qnty  Indications  Ordering  Date



         Provider  

 

 Juluca  1 by mouth every  30tabs  B20  Rod D.  2019



       50-25mg  day      LUCILLE Monk  



 Tablets          



           

 

 Nebulizer  1 unit every 4    J45.909  Haylee  2019



           Device  hours, as needed      FABY Song  



           

 

 Nebulizer  use every 4 hours    J45.909  Haylee  2019



 Kit/Tubing/Mouthpiec  as instructed      FABY Song  



 e          



   Kit          



           

 

 Xopenex  use every 4-6 hours  36ml  J45.909  Rod D.  2019



        0.63mg/3ML  as needed      LUCILLE Monk  



 Nebulizer          



           

 

 Mandibular  please fabricate    G47.33  Haylee  2019



 Advancement Device  mandibular advance      FABY Song  



   device for mild        



 Device  sleep apnea        



           

 

 Atovaquone  1500 mg daily  630ml  B59  Rod DJune  2018



           750mg/5ML        LUCILLE Monk  



 Suspension          



           

 

 Proair HFA  2 inhalations every  17gm  R09.02  Rod DJune  2018



   4-6 hours as needed      LUCILLE Monk  



 108(90Base) mcg/Act          



 Aerosol          



           

 

 Ibuprofen  by mouth three  90tabs    Rod D.  2017



          800mg  times a day      LUCILLE Monk  



 Tablets          



           

 

 Ketoconazole  apply once a day to  120ml    Rod PRATT  



             2%  the scalp and scott      LUCILLE Monk  



 Shampoo          



           

 

 Brimonidine Tartrate  Apply 1-2 Drops To      Unknown  



   Face In The Morning        



 0.2% Solution  as Needed For        



   Redness        







Medications Administered in Office







 Medication  SIG  Qnty  Indications  Ordering Provider  Date

 

 Injection Ceftriaxone Sodium        Rod Monk M.D.  2019



 Per 250 MG (Rocephin)          



               Injection          



           

 

 Toradol Injection 15MG        Nurse Visit Id  2019



                Injection          



           







Immunizations







 CPT Code  Status  Date  Vaccine  Reaction  Lot #

 

 [id number]  Given  10/23/2019  Pneumonia Vaccine    [id number]

 

 88791  Given  10/23/2019  Influenza Virus Vaccine,    [id number]



       Quadrivalent, Split,    



       Preservative Free    

 

 78409  Given  10/11/2018  Influenza Virus Vaccine,    [id number]



       Quadrivalent, Split,    



       Preservative Free    

 

 23072  Given  2018  Hepatitis B Vaccine Adult    [id number]



       Dosage    

 

 27984  Given  2018  Hepatitis B Vaccine Adult    [id number]



       Dosage    

 

 91578  Given  2017  Influenza Virus Vaccine,    [id number]



       Quadrivalent, Split,    



       Preservative Free    

 

 55205  Given  10/21/2016  Pneumococcal Conjugate  no immediate reaction  [id number]



       Vaccine 13 Valent For  noted  



       Intramuscular Use    

 

 05072  Given  2016  Influenza Virus Vaccine,  no immediate reaction  
[id number]



       Quadrivalent, Split,  noted  



       Preservative Free    







Vital Signs







 Date  Vital  Result  Comment

 

 2020  9:22am  Height  70 inches  5'10"









 Weight  237.00 lb  

 

 Heart Rate  72 /min  

 

 BP Systolic Sitting  158 mmHg  

 

 BP Diastolic Sitting  96 mmHg  

 

 Respiratory Rate  14 /min  

 

 Body Temperature  98.7 F  

 

 BMI (Body Mass Index)  34.0 kg/m2  









 2019 12:51pm  Height  70 inches  5'10"









 Weight  244.50 lb  

 

 Heart Rate  60 /min  

 

 BP Systolic Sitting  134 mmHg  

 

 BP Diastolic Sitting  90 mmHg  

 

 Respiratory Rate  14 /min  

 

 Body Temperature  98.7 F  

 

 O2 % BldC Oximetry  96 %  

 

 BMI (Body Mass Index)  35.1 kg/m2  







Results







 Test  Acquired Date  Facility  Test  Result  H/L  Range  Note

 

 HIV-1 Rna  10/04/2019  [facility]  HIV-1 Rna  28 copies/mL  
Abnormal  Undetected  1



 [facility] PCR    [address]  (PCR)        



 [place] [address] [phone]          









 1  Result in log copies/mL is 1.44.



   -------------------ADDITIONAL INFORMATION-------------------



   The quantification range of this assay is 20 to 10,000,000



   copies/mL (1.30 log to 7.00 log copies/mL). Testing was



   performed using the caleb HIV-1 test ([employer]



   [employer] with the caleb [employer] System.



   This test has been modified from the 's



   instructions. Its performance characteristics were



   determined by [facility] in a manner consistent with CLIA



   requirements. This test has not been cleared or approved by



   the U.S. Food and Drug Administration.



   Test Performed by:



   [facility]



   [address] [place] [address]



   : Marty Trammell M.D. Ph.D.; CLIA# [id number]







Procedures







 Description

 

 No Information Available







Medical Devices







 Description

 

 No Information Available







Encounters







 Type  Date  Location  Provider  Dx  Diagnosis

 

 Office Visit  2019  [facility]  RICKIE Winkler  Cough



   1:40p  Infectious Diseases  M.D.    









 B20  Human immunodeficiency virus [HIV] disease









 Office Visit  10/23/2019  [facility]  Rod PRATT  B2Apurva  Human 
immunodeficiency



   9:10a  For Infectious  LUCILLE Monk    virus [HIV] disease



     Diseases      









 K74.69  Other cirrhosis of liver

 

 R21  Rash and other nonspecific skin eruption

 

 Z23  Encounter for immunization

 

 Z13.220  Encounter for screening for lipoid disorders







Assessments







 Date  Code  Description  Provider

 

 2020  B20  Human immunodeficiency virus [HIV]  Rod Monk M.D.



     disease  

 

 2020  K74.69  Other cirrhosis of liver  Rod Monk M.D.

 

 2019  R05  Cough  Rod Monk M.D.

 

 2019  B20  Human immunodeficiency virus [HIV]  Rod Monk M.D.



     disease  

 

 10/23/2019  B20  Human immunodeficiency virus [HIV]  Rod Monk M.D.



     disease  

 

 10/23/2019  K74.69  Other cirrhosis of liver  Rod Monk M.D.

 

 10/23/2019  R21  Rash and other nonspecific skin eruption  Rod Monk M.D.

 

 10/23/2019  Z23  Encounter for immunization  Rod Monk M.D.

 

 10/23/2019  Z13.220  Encounter for screening for lipoid  Rod Monk M.D.



     disorders  







Plan of Treatment

Future Appointment(s):2020  9:50 am - Rod Monk M.D. at [facility]/25/2020 - Rod Monk M.D.B20 Human 
immunodeficiency virus [HIV] petwyurY40.69 Other cirrhosis of liverFollow up:6 
weeks



Functional Status







 Description

 

 No Information Available







Mental Status







 Description

 

 No Information Available







Referrals







 Refer to   Reason for Referral  Status  Appt Date

 

 Radhames Chiang MD  44 year old man with left ear fullness,  Sent  2020









 [address] [place] [id number] [phone]

## 2020-02-26 NOTE — XMS REPORT
Continuity of Care Document (CCD)

 Created on:2020



Patient:Gilberto Mccann

Sex:Male

:1975

External Reference #:[id number]





Demographics







 Address  [address] [place] [address]



   [address]



   [place] [address]

 

 Mobile Phone  [phone]

 

 Email Address  [email or web address]

 

 Preferred Language  en

 

 Marital Status  Not  or 

 

 Tenriism Affiliation  Unknown

 

 Race  White

 

 Ethnic Group  Not  or 









Author







 Name  Radhames Chiang M.D. (transmitted by agent of provider Carline Walker)

 

 Address  [address] [place] [address]









Care Team Providers







 Name  Role  Phone

 

 Rod Monk MD - Infectious  Care Team Information   [phone]
[phone]



 Disease    









Problems







 Description

 

 No Information Available







Social History







 Type  Date  Description  Comments

 

 Birth Sex    Unknown  

 

 Tobacco Use  Start: Unknown  Never Smoked Cigarettes  

 

 ETOH Use    Rare Use Of Alcohol  

 

 Recreational Drug Use    Has Used In Past  







Allergies, Adverse Reactions, Alerts







 Active Allergies  Reaction  Severity  Comments  Date

 

 Penicillin  Hives      2020







Medications







 Active Medications  SIG  Qnty  Indications  Ordering Provider  Date

 

 Juluca                     50-25mg        Unknown  



 Tablets          







Immunizations







 Description

 

 No Information Available







Vital Signs







 Date  Vital  Result  Comment

 

 2020  2:29pm  Weight  239.00 lb  









 Height  69 inches  5'9"

 

 BMI (Body Mass Index)  35.3 kg/m2  

 

 BP Systolic  152 mmHg  

 

 BP Diastolic  90 mmHg  

 

 Heart Rate  76 /min  

 

 O2 % BldC Oximetry  95 %  

 

 Body Temperature  98.5 F  

 

 Pain Level  0  







Results







 Description

 

 No Information Available







Procedures







 Description

 

 No Information Available







Medical Devices







 Description

 

 No Information Available







Encounters







 Description

 

 No Information Available







Assessments







 Description

 

 No Information Available







Plan of Treatment

No Information Available



Functional Status







 Description

 

 No Information Available







Mental Status







 Description

 

 No Information Available







Referrals







 Description

 

 No Information Available

## 2020-04-12 ENCOUNTER — HOSPITAL ENCOUNTER (EMERGENCY)
Dept: HOSPITAL 25 - UCEAST | Age: 45
Discharge: HOME | End: 2020-04-12
Payer: MEDICARE

## 2020-04-12 VITALS — DIASTOLIC BLOOD PRESSURE: 100 MMHG | SYSTOLIC BLOOD PRESSURE: 192 MMHG

## 2020-04-12 DIAGNOSIS — S01.532A: Primary | ICD-10-CM

## 2020-04-12 DIAGNOSIS — S01.511A: ICD-10-CM

## 2020-04-12 DIAGNOSIS — Z88.0: ICD-10-CM
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
## 2020-04-12 DIAGNOSIS — Z21: ICD-10-CM
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
## 2020-04-12 DIAGNOSIS — Z88.5: ICD-10-CM
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
## 2020-04-12 PROCEDURE — 99212 OFFICE O/P EST SF 10 MIN: CPT

## 2020-04-12 PROCEDURE — G0463 HOSPITAL OUTPT CLINIC VISIT: HCPCS
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
## 2020-04-12 NOTE — XMS REPORT
Continuity of Care Document (CCD)

 Created on:2020



Patient:Gilberto Mccann

Sex:Male

:1975

External Reference #:[id number]





Demographics







 Address  [address] [place] [address]



   [address]



   [place] [address]

 

 Mobile Phone  [phone]

 

 Email Address  [email or web address]

 

 Preferred Language  en

 

 Marital Status  Not  or 

 

 Taoism Affiliation  Unknown

 

 Race  White

 

 Ethnic Group  Not  or 









Author







 Name  Radhames Chiang M.D. (transmitted by agent of provider Vicky Edwards)

 

 Address  [address] [place] [address]









Care Team Providers







 Name  Role  Phone

 

 Rod Monk MD - Infectious  Care Team Information   [phone]
[phone]



 Disease    









Problems







 Description

 

 No Information Available







Social History







 Type  Date  Description  Comments

 

 Birth Sex    Unknown  

 

 Tobacco Use  Start: Unknown  Never Smoked Cigarettes  

 

 ETOH Use    Rare Use Of Alcohol  

 

 Recreational Drug Use    Has Used In Past  







Allergies, Adverse Reactions, Alerts







 Active Allergies  Reaction  Severity  Comments  Date

 

 Penicillin  Hives      2020







Medications







 Active Medications  SIG  Qnty  Indications  Ordering Provider  Date

 

 Dexamethasone  one tab daily  5tabs    Radhames Chiang M.D.  2020



           4mg Tablets  for 5 days        



           

 

 Biktarvy        Unknown  



      -25mg          



 Tablets          



           









 History Medications









 Dexamethasone  one tab daily  5tabs    Radhames Chiang,  2020 -



          4mg Tablets  for 5 days      M.D.  2020



           







Immunizations







 Description

 

 No Information Available







Vital Signs







 Date  Vital  Result  Comment

 

 2020  9:02am  Weight  234.00 lb  









 Height  69 inches  5'9"

 

 BMI (Body Mass Index)  34.6 kg/m2  

 

 BP Systolic  144 mmHg  

 

 BP Diastolic  94 mmHg  

 

 Heart Rate  69 /min  

 

 O2 % BldC Oximetry  95 %  

 

 Body Temperature  98.1 F  

 

 Pain Level  0  









 2020  2:29pm  Weight  239.00 lb  









 Height  69 inches  5'9"

 

 BMI (Body Mass Index)  35.3 kg/m2  

 

 BP Systolic  152 mmHg  

 

 BP Diastolic  90 mmHg  

 

 Heart Rate  76 /min  

 

 O2 % BldC Oximetry  95 %  

 

 Body Temperature  98.5 F  

 

 Pain Level  0  







Results







 Description

 

 No Information Available







Procedures







 Date  Code  Description  Status

 

 2020  69771  Tympanometry  Completed

 

 2020  83601  Audiometry, Comprehensive  Completed

 

 2020  67820  Cat Scan Orbit/Ear W/O Contrast,computed tomography  
Completed

 

 2020  98404  Cat Scan Orbit/Ear W/O Contrast,computed tomography  
Completed

 

 2020  47663  Cat Scan Orbit/Ear W/O Contrast,computed tomography  
Completed

 

 2020  31717  Nasal Endoscopy, Diag.  Completed







Medical Devices







 Description

 

 No Information Available







Encounters







 Type  Date  Location  Provider  Dx  Diagnosis

 

 Office Visit  2020  Main Office  Radhames Chiang M.D.  H69.92  Unspecified



   9:15a        Eustachian tube



           disorder, left ear

 

 Office Visit  2020  Main Office  Radhames Chiang M.D.  H69.92  Unspecified



   2:30p        Eustachian tube



           disorder, left ear









 J31.0  Chronic rhinitis

 

 J34.2  Deviated nasal septum







Assessments







 Date  Code  Description  Provider

 

 2020  H69.92  Unspecified Eustachian tube disorder, left ear  Radhames Chiang M.D.

 

 2020  H69.92  Unspecified Eustachian tube disorder, left ear  Marion Huitron MD

 

 2020  H69.92  Unspecified Eustachian tube disorder, left ear  Radhames Chiang M.D.

 

 2020  J31.0  Chronic rhinitis  Radhames Chinag M.D.

 

 2020  J34.2  Deviated nasal septum  Radhames Chiang M.D.







Plan of Treatment

No Information Available



Functional Status







 Description

 

 No Information Available







Mental Status







 Description

 

 No Information Available







Referrals







 Description

 

 No Information Available

## 2020-04-12 NOTE — XMS REPORT
Continuity of Care Document (CCD)

 Created on:2020



Patient:Gilberto Mccann

Sex:Male

:1975

External Reference #:[id number]





Demographics







 Address  [address]  [address]



   [place] [address]

 

 Mobile Phone  [phone]

 

 Email Address  [email or web address]

 

 Preferred Language  en

 

 Marital Status  Not  or 

 

 Restorationist Affiliation  Unknown

 

 Race  White

 

 Ethnic Group  Not  or 









Author







 Name  Vivian Pettit NP (transmitted by agent of provider Aleah Cardona)

 

 Address  [address] [place] [address]









Care Team Providers







 Name  Role  Phone

 

 Rod Monk MD - Infectious  Care Team Information   [phone]
[phone]



 Disease    









Problems







 Active Problems  Provider  Date

 

 Human immunodeficiency virus infection  Rod Monk M.D.  Onset: 

 

 Chronic hepatitis C  Rod Monk M.D.  Onset: 2016

 

 Cirrhosis - non-alcoholic  Rod Monk M.D.  Onset: 2016

 

 Low back pain  Vito Carrera MD  Onset: 2017

 

 Displacement of lumbar intervertebral  Vito Carrera MD  Onset: 2017



 disc without myelopathy    

 

 Lumbar spondylosis  Vito Carrera MD  Onset: 2017

 

 Acute respiratory failure with hypoxia  Guerita Pruett M.D.  Onset: 2018

 

 Cannabis abuse  Guerita Pruett M.D.  Onset: 2018

 

 Anemia  Guerita Pruett M.D.  Onset: 2018

 

 Hypoxemia  Guerita Pruett M.D.  Onset: 2018

 

 Thrombocytopenic disorder  Guerita Pruett M.D.  Onset: 2018

 

 Viral hepatitis C  Guerita Pruett M.D.  Onset: 2018







Social History







 Type  Date  Description  Comments

 

 Birth Sex    Unknown  

 

 Tobacco Use  Start: Unknown  Never Smoked Cigarettes  

 

 Smoking Status  Reviewed: 20  Never Smoked Cigarettes  

 

 ETOH Use    Rarely consumes alcohol  

 

 Tobacco Use  Start: Unknown  Patient has never smoked  

 

 Recreational Drug Use    Sporadically uses Marijuana  

 

 Exercise Type/Frequency    Does not exercise  







Allergies, Adverse Reactions, Alerts







 Active Allergies  Reaction  Severity  Comments  Date

 

 Penicillin  childhood reaction      05/10/2016

 

 Dilaudid  Nausea and Vomiting      05/10/2016







Medications







 Active Medications  SIG  Qnty  Indications  Ordering  Date



         Provider  

 

 Tramadol HCL  one to two by  30tabs    Rod DJune  2020



            50mg  mouth three times      LUCILLE Monk  



 Tablets  per day as needed        



   for pain        

 

 Juluca  1 by mouth every  30tabs  B20  Rod D.  2019



      50-25mg Tablets  day      LUCILLE Monk  



           

 

 Nebulizer  1 unit every 4    J45.909  Haylee  2019



          Device  hours, as needed      FABY Song  



           

 

 Nebulizer  use every 4 hours    J45.909  Haylee  2019



 Kit/Tubing/Mouthpiece  as instructed      FABY Song  



           



 Kit          

 

 Xopenex  use every 4-6  36ml  J45.909  Rod D.  2019



       0.63mg/3ML  hours as needed      LUCILLE Monk  



 Nebulizer          



           

 

 Mandibular  please fabricate    G47.33  Haylee  2019



 Advancement Device  mandibular advance      FABY Song  



   device for mild        



 Device  sleep apnea        



           

 

 Atovaquone  1500 mg daily  630ml  B59  Rod DJune  2018



          750mg/5ML        LUCILLE Monk  



 Suspension          



           

 

 Proair HFA  2 inhalations  17gm  R09.02  Rod D.  2018



          108(90Base)  every 4-6 hours as      LUCILLE Monk  



 mcg/Act Aerosol  needed        



           

 

 Ibuprofen  by mouth three  90tabs    Rod D.  2017



         800mg Tablets  times a day      LUCILLE Monk  



           

 

 Ketoconazole  apply once a day  120ml    Rod D.  



            2% Shampoo  to the scalp and      LUCILLE Monk  



   scott        

 

 Brimonidine Tartrate  Apply 1-2 Drops To      Unknown  



   Face In The        



 0.2% Solution  Morning as Needed        



   For Redness        

 

 Methocarbamol  1 by mouth three  30tabs    Rod D.  



             500mg  times a day as      LUCILLE Monk  



 Tablets  needed        



           

 

 Prednisone  2 po q Am x 4 days      Mark Wisdom  



          20mg Tablets          



           







Medications Administered in Office







 Medication  SIG  Qnty  Indications  Ordering Provider  Date

 

 Injection Ceftriaxone Sodium        Rod Monk M.D.  2019



 Per 250 MG (Rocephin)          



               Injection          



           

 

 Toradol Injection 15MG        Nurse Visit Id  2019



                Injection          



           







Immunizations







 CPT Code  Status  Date  Vaccine  Reaction  Lot #

 

 [id number]  Given  10/23/2019  Pneumonia Vaccine    [id number]

 

 38327  Given  10/23/2019  Influenza Virus Vaccine,    [id number]



       Quadrivalent, Split,    



       Preservative Free    

 

 28405  Given  10/11/2018  Influenza Virus Vaccine,    [id number]



       Quadrivalent, Split,    



       Preservative Free    

 

 90240  Given  2018  Hepatitis B Vaccine Adult    [id number]



       Dosage    

 

 63326  Given  2018  Hepatitis B Vaccine Adult    [id number]



       Dosage    

 

 83447  Given  2017  Influenza Virus Vaccine,    [id number]



       Quadrivalent, Split,    



       Preservative Free    

 

 09315  Given  10/21/2016  Pneumococcal Conjugate  no immediate reaction  [id number]



       Vaccine 13 Valent For  noted  



       Intramuscular Use    

 

 47865  Given  2016  Influenza Virus Vaccine,  no immediate reaction  
[id number]



       Quadrivalent, Split,  noted  



       Preservative Free    







Vital Signs







 Date  Vital  Result  Comment

 

 2020 12:45pm  Height  70 inches  5'10"









 Heart Rate  72 /min  

 

 BP Systolic Sitting  138 mmHg  

 

 BP Diastolic Sitting  84 mmHg  

 

 Respiratory Rate  14 /min  

 

 Body Temperature  99.0 F  

 

 Pain Level  7  









 2020  9:22am  Height  70 inches  5'10"









 Weight  237.00 lb  

 

 Heart Rate  72 /min  

 

 BP Systolic Sitting  158 mmHg  

 

 BP Diastolic Sitting  96 mmHg  

 

 Respiratory Rate  14 /min  

 

 Body Temperature  98.7 F  

 

 BMI (Body Mass Index)  34.0 kg/m2  







Results







 Test  Acquired Date  Facility  Test  Result  H/L  Range  Note

 

 Comp Metabolic  2020  [facility]  Sodium  141 mmol/L  Normal  
135-145  



 Panel    [address]          



     [place] [address] [phone]          









 Potassium  3.5 mmol/L  Normal  3.5-5.0  

 

 Chloride  105 mmol/L  Normal  101-111  

 

 Co2 Carbon Dioxide  29 mmol/L  Normal  22-32  

 

 Anion Gap  7 mmol/L  Normal  2-11  

 

 Glucose  101 mg/dL  High    

 

 Blood Urea Nitrogen  17 mg/dL  Normal  6-24  

 

 Creatinine  0.88 mg/dL  Normal  0.67-1.17  

 

 BUN/Creatinine Ratio  19.3  Normal  8-20  

 

 Calcium  9.6 mg/dL  Normal  8.6-10.3  

 

 Total Protein  7.0 g/dL  Normal  6.4-8.9  

 

 Albumin  4.9 g/dL  Normal  3.2-5.2  

 

 Globulin  2.1 g/dL  Normal  2-4  

 

 Albumin/Globulin Ratio  2.3  Normal  1-3  

 

 Total Bilirubin  2.50 mg/dL  High  0.2-1.0  

 

 Alkaline Phosphatase  48 U/L  Normal    

 

 Alt  36 U/L  Normal  7-52  

 

 Ast  37 U/L  Normal  13-39  

 

 Egfr Non-  95.0    >60  

 

 Egfr   114.9    >60  1









 CD4/CD8  2020  [facility]  Absolute  0.61  Abnormal  0.82-
2.84  



 T-Cell    [address]  CD45 Count  thou/mcL      



 [place] [address] [phone]          









 % CD3  71 %    58-86  

 

 % CD4  16 %  Abnormal  32-64  

 

 % CD8  50 %  Abnormal  13-40  

 

 CD3  432 cells/L  Abnormal  550-2202  

 

 CD4  99 cells/L  Abnormal  365-1437  

 

 CD8  302 cells/L    145-846  

 

 4/8 H/S Ratio  0.3  Abnormal  >=0.9  

 

 CD4 Reviewed By  See Comment      2









 CBC Auto  2020  [facility]  White Blood  3.7 10^3/uL  Normal  
3.5-10.8  



 Diff    [address]  Count        



     [place] [address]          



     [phone]          









 Red Blood Count  3.83 10^6/uL  Low  4.18-5.48  

 

 Hemoglobin  12.3 g/dL  Low  14.0-18.0  

 

 Hematocrit  36 %  Low  42-52  

 

 Mean Corpuscular Volume  95 fL  High  80-94  

 

 Mean Corpuscular Hemoglobin  32 pg  High  27-31  

 

 Mean Corpuscular HGB Conc  34 g/dL  Normal  31-36  

 

 Red Cell Distribution Width  16 %  High  10-15  

 

 Platelet Count  123 10^3/uL  Low  150-450  

 

 Mean Platelet Volume  8.4 fL  Normal  7.4-10.4  

 

 Abs Neutrophils  2.7 10^3/uL  Normal  1.5-7.7  

 

 Abs Lymphocytes  0.7 10^3/uL  Low  1.0-4.8  

 

 Abs Monocytes  0.2 10^3/uL  Normal  0-0.8  

 

 Abs Eosinophils  0.0 10^3/uL  Normal  0-0.6  

 

 Abs Basophils  0.0 10^3/uL  Normal  0-0.2  

 

 Abs Nucleated RBC  0.0 10^3/uL      

 

 Granulocyte %  73.1 %      

 

 Lymphocyte %  20.3 %      

 

 Monocyte %  6.1 %      

 

 Eosinophil %  0.2 %      

 

 Basophil %  0.3 %      

 

 Nucleated Red Blood Cells %  0.2      









 HIV-1 Rna  2020  [facility]  HIV-1 Rna  7050  Abnormal  
Undetected  3



 QNT By PCR    [address]  (PCR)  copies/mL      



 [place] [address] [phone]          

 

 HIV-1 Rna  10/04/2019  [facility]  HIV-1 Rna  28 copies/mL  
Abnormal  Undetected  4



 QNT By PCR    [address]  (PCR)        



 [place] [address] [phone]          









 1  *******Because ethnic data is not always readily available,



   this report includes an eGFR for both -Americans and



   non- Americans.****



   The National Kidney Disease Education Program (NKDEP) does



   not endorse the use of the MDRD equation for patients that



   are not between the ages of 18 and 70, are pregnant, have



   extremes of body size, muscle mass, or nutritional status,



   or are non- or non-.



   According to the National Kidney Foundation, irrespective of



   diagnosis, the stage of the disease is based on the level of



   kidney function:



   Stage Description                      GFR(mL/min/1.73 m(2))



   1     Kidney damage with normal or decreased GFR       90



   2     Kidney damage with mild decrease in GFR          60-89



   3     Moderate decrease in GFR                         30-59



   4     Severe decrease in GFR                           15-29



   5     Kidney failure                       <15 (or dialysis)

 

 2  RESULT: Reviewed by: Shelton Smith M.D.



   -------------------ADDITIONAL INFORMATION-------------------



   Reference values implemented 2013.



   This test was developed using an analyte specific reagent.



   Its performance characteristics were determined by [facility] in a manner consistent with CLIA requirements. This



   test has not been cleared or approved by the U.S. Food and



   Drug Administration.



   Test Performed by:



   [address] [address]



   : Marty Trammell M.D. Ph.D.; CLIA# [id number]

 

 3  Result in log copies/mL is 3.85.



   -------------------ADDITIONAL INFORMATION-------------------



   The quantification range of this assay is 20 to 10,000,000



   copies/mL (1.30 log to 7.00 log copies/mL). Testing was



   performed using the caleb HIV-1 test ([employer]



   [employer] with the caleb 6800 System.



   This test has been modified from the 's



   instructions. Its performance characteristics were



   determined by [facility] in a manner consistent with CLIA



   requirements. This test has not been cleared or approved by



   the U.S. Food and Drug Administration.



   Test Performed by:



   [place] [address]



   : Marty Trammell M.D. Ph.D.; CLIA# [id number]

 

 4  Result in log copies/mL is 1.44.



   -------------------ADDITIONAL INFORMATION-------------------



   The quantification range of this assay is 20 to 10,000,000



   copies/mL (1.30 log to 7.00 log copies/mL). Testing was



   performed using the caleb HIV-1 test ([employer]



   [employer] with the caleb 6800 System.



   This test has been modified from the 's



   instructions. Its performance characteristics were



   determined by [facility] in a manner consistent with CLIA



   requirements. This test has not been cleared or approved by



   the U.S. Food and Drug Administration.



   Test Performed by:



   [place] [address]



   : Marty Trammell M.D. Ph.D.; CLIA# [id number]







Procedures







 Description

 

 No Information Available







Medical Devices







 Description

 

 No Information Available







Encounters







 Type  Date  Location  Provider  Dx  Diagnosis

 

 Office Visit  2020  [facility]  Vivian Jones  M54.5  Low back pain



   1:00p  For Infectious  Pettit, NP    



     Diseases      

 

 Office Visit  2020  [facility]  Rod PRATT  B20  Human 
immunodeficiency



   9:10a  For Infectious  LUCILLE Monk    virus [HIV] disease



     Diseases      









 K74.69  Other cirrhosis of liver

 

 E66.09  Other obesity due to excess calories

 

 R03.0  Elevated blood-pressure reading, w/o diagnosis of htn









 Office Visit  2019  1:40p  [facility]  Rod Monk,  R05  
Cough



     Infectious Diseases  M.D.    









 B20  Human immunodeficiency virus [HIV] disease









 Office Visit  10/23/2019  [facility]  Rod PRATT  B2Apurva  Human 
immunodeficiency



   9:10a  For Infectious  LUCILLE Monk    virus [HIV] disease



     Diseases      









 K74.69  Other cirrhosis of liver

 

 R21  Rash and other nonspecific skin eruption

 

 Z23  Encounter for immunization

 

 Z13.220  Encounter for screening for lipoid disorders







Assessments







 Date  Code  Description  Provider

 

 2020  M54.5  Low back pain  Vivian Pettit, FABY

 

 2020  B20  Human immunodeficiency virus [HIV]  Rod Monk M.D.



     disease  

 

 2020  K74.69  Other cirrhosis of liver  Rod Monk M.D.

 

 2020  E66.09  Other obesity due to excess calories  Rod Monk M.D.

 

 2020  R03.0  Elevated blood-pressure reading,  Rod Monk M.D.



     without diagnosis of hypertension  

 

 2019  R05  Cough  Rod Monk M.D.

 

 2019  B20  Human immunodeficiency virus [HIV]  Rod Monk M.D.



     disease  

 

 10/23/2019  B20  Human immunodeficiency virus [HIV]  Rod Monk M.D.



     disease  

 

 10/23/2019  K74.69  Other cirrhosis of liver  Rod Monk M.D.

 

 10/23/2019  R21  Rash and other nonspecific skin  Rod Monk M.D.



     eruption  

 

 10/23/2019  Z23  Encounter for immunization  Rod Monk M.D.

 

 10/23/2019  Z13.220  Encounter for screening for lipoid  Rod Monk M.D.



     disorders  







Plan of Treatment

Future Appointment(s):2020  9:50 am - Rod Monk M.D. at [facility] - Viviannelson Pettit, NPM54.5 
Low back painComments:Get the book Treat Your Own Back by Won Hopson. This 
give exercises for when your back is feeling good and when it is bothering you. 
You can take Motrin 600 mg every 6 hours as needed for pain, you can take this 
with the Tramadol. Go to the emergency room if you develop loss of bowel or 
bladder control, numbness or weakness in the legs.



Functional Status







 Description

 

 No Information Available







Mental Status







 Description

 

 No Information Available







Referrals







 Refer to   Reason for Referral  Status  Appt Date

 

 Vito Carrera MD  45 yo history of low back pain  Sent  









 [address] [place] [address] [phone]

 

 









 Radhames Chiang MD  44 year old man with left ear fullness,  Received Complete  
2020



       









 [address] [place] [address] [phone]

## 2020-04-12 NOTE — XMS REPORT
Continuity of Care Document (CCD)

 Created on:2020



Patient:Gilberto Mccann

Sex:Male

:1975

External Reference #:[id number]





Demographics







 Address  [address] [place] [address]



   [address]



   [place] [address]

 

 Mobile Phone  [phone]

 

 Email Address  [email or web address]

 

 Preferred Language  en

 

 Marital Status  Not  or 

 

 Zoroastrianism Affiliation  Unknown

 

 Race  White

 

 Ethnic Group  Not  or 









Author







 Name  Radhames Chiang M.D.

 

 Address  [address] [place] [address]









Care Team Providers







 Name  Role  Phone

 

 Rod Monk MD - Infectious  Care Team Information   [phone]
[phone]



 Disease    









Problems







 Description

 

 No Information Available







Social History







 Type  Date  Description  Comments

 

 Birth Sex    Unknown  

 

 Tobacco Use  Start: Unknown  Never Smoked Cigarettes  

 

 ETOH Use    Rare Use Of Alcohol  

 

 Recreational Drug Use    Has Used In Past  







Allergies, Adverse Reactions, Alerts







 Active Allergies  Reaction  Severity  Comments  Date

 

 Penicillin  Hives      2020







Medications







 Active Medications  SIG  Qnty  Indications  Ordering Provider  Date

 

 Biktarvy        Unknown  



 -25mg Tablets          



           









 History Medications









 Dexamethasone  one tab daily  5tabs    Radhames Chiang,  2020 -



          4mg Tablets  for 5 days      M.D.  2020



           







Immunizations







 Description

 

 No Information Available







Vital Signs







 Date  Vital  Result  Comment

 

 2020  9:02am  Weight  234.00 lb  









 Height  69 inches  5'9"

 

 BMI (Body Mass Index)  34.6 kg/m2  

 

 BP Systolic  144 mmHg  

 

 BP Diastolic  94 mmHg  

 

 Heart Rate  69 /min  

 

 O2 % BldC Oximetry  95 %  

 

 Body Temperature  98.1 F  

 

 Pain Level  0  









 2020  2:29pm  Weight  239.00 lb  









 Height  69 inches  5'9"

 

 BMI (Body Mass Index)  35.3 kg/m2  

 

 BP Systolic  152 mmHg  

 

 BP Diastolic  90 mmHg  

 

 Heart Rate  76 /min  

 

 O2 % BldC Oximetry  95 %  

 

 Body Temperature  98.5 F  

 

 Pain Level  0  







Results







 Description

 

 No Information Available







Procedures







 Date  Code  Description  Status

 

 2020  11179  Tympanometry  Completed

 

 2020  83636  Audiometry, Comprehensive  Completed

 

 2020  65178  Cat Scan Orbit/Ear W/O Contrast,computed tomography  
Completed

 

 2020  32880  Cat Scan Orbit/Ear W/O Contrast,computed tomography  
Completed

 

 2020  30962  Cat Scan Orbit/Ear W/O Contrast,computed tomography  
Completed

 

 2020  12262  Nasal Endoscopy, Diag.  Completed







Medical Devices







 Description

 

 No Information Available







Encounters







 Type  Date  Location  Provider  Dx  Diagnosis

 

 Office Visit  2020  Main Office  Radhames Chiang M.D.  H69.92  Unspecified



   2:30p        Eustachian tube



           disorder, left ear









 J31.0  Chronic rhinitis

 

 J34.2  Deviated nasal septum







Assessments







 Date  Code  Description  Provider

 

 2020  H69.92  Unspecified Eustachian tube disorder, left ear  Marion Huitron MD

 

 2020  H69.92  Unspecified Eustachian tube disorder, left ear  Radhames Chiang M.D.

 

 2020  J31.0  Chronic rhinitis  Radhames Chiang M.D.

 

 2020  J34.2  Deviated nasal septum  Radhames Chiang M.D.







Plan of Treatment

No Information Available



Functional Status







 Description

 

 No Information Available







Mental Status







 Description

 

 No Information Available







Referrals







 Description

 

 No Information Available